# Patient Record
Sex: FEMALE | Race: WHITE | Employment: FULL TIME | URBAN - NONMETROPOLITAN AREA
[De-identification: names, ages, dates, MRNs, and addresses within clinical notes are randomized per-mention and may not be internally consistent; named-entity substitution may affect disease eponyms.]

---

## 2017-03-06 ENCOUNTER — OFFICE VISIT (OUTPATIENT)
Age: 57
End: 2017-03-06

## 2017-03-06 VITALS
HEIGHT: 62 IN | SYSTOLIC BLOOD PRESSURE: 134 MMHG | TEMPERATURE: 97.5 F | RESPIRATION RATE: 16 BRPM | DIASTOLIC BLOOD PRESSURE: 80 MMHG | WEIGHT: 149 LBS | HEART RATE: 76 BPM | BODY MASS INDEX: 27.42 KG/M2 | OXYGEN SATURATION: 96 %

## 2017-03-06 DIAGNOSIS — R10.13 EPIGASTRIC PAIN: ICD-10-CM

## 2017-03-06 DIAGNOSIS — R13.10 DYSPHAGIA, UNSPECIFIED TYPE: ICD-10-CM

## 2017-03-06 DIAGNOSIS — R12 HEARTBURN: Primary | ICD-10-CM

## 2017-03-06 DIAGNOSIS — Z98.890 S/P NISSEN FUNDOPLICATION (WITHOUT GASTROSTOMY TUBE) PROCEDURE: ICD-10-CM

## 2017-03-06 PROCEDURE — 99214 OFFICE O/P EST MOD 30 MIN: CPT | Performed by: SURGERY

## 2017-03-06 ASSESSMENT — ENCOUNTER SYMPTOMS
DIARRHEA: 1
EYE PAIN: 0
CHEST TIGHTNESS: 0
SORE THROAT: 0
BACK PAIN: 0
CONSTIPATION: 1
COUGH: 0
ABDOMINAL PAIN: 1
CHOKING: 0
EYE REDNESS: 0
VOMITING: 0
SINUS PRESSURE: 0
RECTAL PAIN: 0
EYE DISCHARGE: 0
STRIDOR: 0
PHOTOPHOBIA: 0
ANAL BLEEDING: 0
COLOR CHANGE: 0
BLOOD IN STOOL: 0
ABDOMINAL DISTENTION: 1
RHINORRHEA: 0
NAUSEA: 0
FACIAL SWELLING: 0
SHORTNESS OF BREATH: 0
VOICE CHANGE: 0
APNEA: 0
EYE ITCHING: 0
TROUBLE SWALLOWING: 0
WHEEZING: 0

## 2017-03-17 ENCOUNTER — TELEPHONE (OUTPATIENT)
Age: 57
End: 2017-03-17

## 2017-03-17 RX ORDER — PANTOPRAZOLE SODIUM 40 MG
40 TABLET, DELAYED RELEASE (ENTERIC COATED) ORAL 2 TIMES DAILY
Qty: 60 TABLET | Refills: 2 | Status: SHIPPED | OUTPATIENT
Start: 2017-03-17 | End: 2017-04-16

## 2017-11-09 ENCOUNTER — HOSPITAL ENCOUNTER (OUTPATIENT)
Dept: GENERAL RADIOLOGY | Age: 57
Discharge: HOME | End: 2017-11-09
Payer: COMMERCIAL

## 2017-11-09 DIAGNOSIS — K22.4 ESOPHAGEAL SPASM: ICD-10-CM

## 2017-11-09 DIAGNOSIS — K21.9 GASTROESOPHAGEAL REFLUX DISEASE, ESOPHAGITIS PRESENCE NOT SPECIFIED: ICD-10-CM

## 2017-11-09 PROCEDURE — 2500000003 HC RX 250 WO HCPCS: Performed by: INTERNAL MEDICINE

## 2017-11-09 PROCEDURE — 74220 X-RAY XM ESOPHAGUS 1CNTRST: CPT

## 2017-11-09 RX ADMIN — BARIUM SULFATE 140 ML: 980 POWDER, FOR SUSPENSION ORAL at 10:20

## 2017-11-09 RX ADMIN — BARIUM SULFATE 70 ML: 0.6 SUSPENSION ORAL at 10:20

## 2017-11-21 ENCOUNTER — HOSPITAL ENCOUNTER (OUTPATIENT)
Age: 57
Setting detail: OUTPATIENT SURGERY
Discharge: HOME | End: 2017-11-21
Attending: INTERNAL MEDICINE | Admitting: INTERNAL MEDICINE
Payer: COMMERCIAL

## 2017-11-21 VITALS
RESPIRATION RATE: 18 BRPM | WEIGHT: 151 LBS | HEIGHT: 62 IN | SYSTOLIC BLOOD PRESSURE: 129 MMHG | HEART RATE: 67 BPM | OXYGEN SATURATION: 97 % | BODY MASS INDEX: 27.79 KG/M2 | TEMPERATURE: 97.3 F | DIASTOLIC BLOOD PRESSURE: 71 MMHG

## 2017-11-21 PROCEDURE — 6370000000 HC RX 637 (ALT 250 FOR IP)

## 2017-11-21 ASSESSMENT — PAIN - FUNCTIONAL ASSESSMENT: PAIN_FUNCTIONAL_ASSESSMENT: 0-10

## 2018-01-20 ENCOUNTER — HOSPITAL ENCOUNTER (EMERGENCY)
Age: 58
Discharge: HOME | End: 2018-01-20
Payer: COMMERCIAL

## 2018-01-20 VITALS
BODY MASS INDEX: 27.6 KG/M2 | SYSTOLIC BLOOD PRESSURE: 139 MMHG | HEART RATE: 64 BPM | HEIGHT: 62 IN | OXYGEN SATURATION: 95 % | WEIGHT: 150 LBS | RESPIRATION RATE: 16 BRPM | TEMPERATURE: 98.1 F | DIASTOLIC BLOOD PRESSURE: 70 MMHG

## 2018-01-20 DIAGNOSIS — H70.92 MASTOIDITIS OF LEFT SIDE: Primary | ICD-10-CM

## 2018-01-20 PROCEDURE — 99213 OFFICE O/P EST LOW 20 MIN: CPT | Performed by: NURSE PRACTITIONER

## 2018-01-20 PROCEDURE — 99212 OFFICE O/P EST SF 10 MIN: CPT

## 2018-01-20 PROCEDURE — 96372 THER/PROPH/DIAG INJ SC/IM: CPT

## 2018-01-20 PROCEDURE — 6360000002 HC RX W HCPCS: Performed by: NURSE PRACTITIONER

## 2018-01-20 RX ORDER — NICOTINE 14MG/24HR
1 PATCH, TRANSDERMAL 24 HOURS TRANSDERMAL DAILY
Qty: 14 CAPSULE | Refills: 0 | Status: SHIPPED | OUTPATIENT
Start: 2018-01-20 | End: 2018-02-03

## 2018-01-20 RX ORDER — METHYLPREDNISOLONE ACETATE 40 MG/ML
40 INJECTION, SUSPENSION INTRA-ARTICULAR; INTRALESIONAL; INTRAMUSCULAR; SOFT TISSUE ONCE
Status: COMPLETED | OUTPATIENT
Start: 2018-01-20 | End: 2018-01-20

## 2018-01-20 RX ORDER — METHYLPREDNISOLONE ACETATE 80 MG/ML
80 INJECTION, SUSPENSION INTRA-ARTICULAR; INTRALESIONAL; INTRAMUSCULAR; SOFT TISSUE ONCE
Status: COMPLETED | OUTPATIENT
Start: 2018-01-20 | End: 2018-01-20

## 2018-01-20 RX ORDER — CLINDAMYCIN HYDROCHLORIDE 300 MG/1
300 CAPSULE ORAL 3 TIMES DAILY
Qty: 30 CAPSULE | Refills: 0 | Status: SHIPPED | OUTPATIENT
Start: 2018-01-20 | End: 2018-01-30

## 2018-01-20 RX ORDER — ACETAMINOPHEN AND CODEINE PHOSPHATE 300; 30 MG/1; MG/1
1-2 TABLET ORAL EVERY 6 HOURS PRN
Qty: 12 TABLET | Refills: 0 | Status: SHIPPED | OUTPATIENT
Start: 2018-01-20 | End: 2018-01-23

## 2018-01-20 RX ADMIN — METHYLPREDNISOLONE ACETATE 80 MG: 80 INJECTION, SUSPENSION INTRA-ARTICULAR; INTRALESIONAL; INTRAMUSCULAR; SOFT TISSUE at 15:43

## 2018-01-20 RX ADMIN — METHYLPREDNISOLONE ACETATE 40 MG: 40 INJECTION, SUSPENSION INTRA-ARTICULAR; INTRALESIONAL; INTRAMUSCULAR; SOFT TISSUE at 15:43

## 2018-01-20 RX ADMIN — PENICILLIN G BENZATHINE AND PENICILLIN G PROCAINE 1.2 MILLION UNITS: 900000; 300000 INJECTION, SUSPENSION INTRAMUSCULAR at 15:44

## 2018-01-20 ASSESSMENT — ENCOUNTER SYMPTOMS
COLOR CHANGE: 0
COUGH: 0
APNEA: 0
WHEEZING: 0
STRIDOR: 0
SHORTNESS OF BREATH: 0
CHEST TIGHTNESS: 0
BACK PAIN: 0
CHOKING: 0

## 2018-01-20 ASSESSMENT — PAIN DESCRIPTION - LOCATION: LOCATION: EAR

## 2018-01-20 ASSESSMENT — PAIN DESCRIPTION - PAIN TYPE: TYPE: ACUTE PAIN

## 2018-01-20 ASSESSMENT — PAIN DESCRIPTION - DESCRIPTORS: DESCRIPTORS: SHOOTING

## 2018-01-20 ASSESSMENT — PAIN SCALES - GENERAL: PAINLEVEL_OUTOF10: 10

## 2018-01-20 ASSESSMENT — PAIN DESCRIPTION - ORIENTATION: ORIENTATION: LEFT

## 2018-01-20 NOTE — ED PROVIDER NOTES
surgical history that includes Foot surgery (); laparoscopy (); Tonsillectomy (); Endoscopy, colon, diagnostic (12); Bunionectomy (12); Colonoscopy (12); Colonoscopy ();  section (); Appendectomy (); Breast surgery (2002); Tunneled venous port placement (2003 [facility])); other surgical history (2003); colectomy (2013); Upper gastrointestinal endoscopy (2/15/13); Dilatation, esophagus (); Dilation and curettage of uterus ([id number]); Foot Osteotomy (Right, 2013); hernia repair (3/1/16); Knee arthroscopy (Right, 2016); Ovary removal (Right, ); hiatal hernia repair (2016); and Abdomen surgery. CURRENT MEDICATIONS       Previous Medications    DOCUSATE SODIUM (COLACE) 100 MG CAPSULE    Take 1 capsule by mouth 2 times daily as needed for Constipation    MULTIPLE VITAMINS-MINERALS (WOMENS MULTI VITAMIN & MINERAL) TABS    Take 1 tablet by mouth daily    NAPROXEN (NAPROSYN) 500 MG TABLET    500 mg daily    OMEPRAZOLE (PRILOSEC) 40 MG DELAYED RELEASE CAPSULE    Take 1 capsule by mouth every morning On empty stomach followed by a meal within 20 min    PROTONIX 40 MG TABLET    Take 1 tablet by mouth 2 times daily    RANITIDINE (ZANTAC) 150 MG TABLET    Take 1 tablet by mouth nightly    SERTRALINE (ZOLOFT) 50 MG TABLET    Take 50 mg by mouth daily       ALLERGIES     Patient is is allergic to vicodin [hydrocodone-acetaminophen]; duricef [cefadroxil]; and tape [adhesive tape]. FAMILY HISTORY     Patient's family history includes Arthritis in her mother; Cancer in her father; Heart Disease in her mother; High Blood Pressure in her father. SOCIAL HISTORY     Patient  reports that she has never smoked. She has never used smokeless tobacco. She reports that she does not drink alcohol or use drugs.     PHYSICAL EXAM     ED TRIAGE VITALS  BP: (!) 149/72, Temp: 98.1 °F (36.7 °C), Pulse: 82, Resp: 16, SpO2: 95 %  Physical Exam   Constitutional:

## 2018-05-15 ENCOUNTER — HOSPITAL ENCOUNTER (OUTPATIENT)
Dept: WOMENS IMAGING | Age: 58
Discharge: HOME | End: 2018-05-15
Payer: COMMERCIAL

## 2018-05-15 DIAGNOSIS — M81.0 OSTEOPOROSIS, UNSPECIFIED OSTEOPOROSIS TYPE, UNSPECIFIED PATHOLOGICAL FRACTURE PRESENCE: ICD-10-CM

## 2018-05-15 PROCEDURE — 77080 DXA BONE DENSITY AXIAL: CPT

## 2019-08-15 ENCOUNTER — PROCEDURE VISIT (OUTPATIENT)
Dept: NEUROLOGY | Age: 59
End: 2019-08-15
Payer: COMMERCIAL

## 2019-08-15 DIAGNOSIS — R20.0 BILATERAL HAND NUMBNESS: ICD-10-CM

## 2019-08-15 DIAGNOSIS — G56.03 BILATERAL CARPAL TUNNEL SYNDROME: Primary | ICD-10-CM

## 2019-08-15 PROCEDURE — 95886 MUSC TEST DONE W/N TEST COMP: CPT | Performed by: PSYCHIATRY & NEUROLOGY

## 2019-08-15 PROCEDURE — 95911 NRV CNDJ TEST 9-10 STUDIES: CPT | Performed by: PSYCHIATRY & NEUROLOGY

## 2020-10-20 ENCOUNTER — NURSE ONLY (OUTPATIENT)
Dept: LAB | Age: 60
End: 2020-10-20

## 2022-11-17 ENCOUNTER — HOSPITAL ENCOUNTER (EMERGENCY)
Age: 62
Discharge: HOME | End: 2022-11-17
Attending: EMERGENCY MEDICINE
Payer: COMMERCIAL

## 2022-11-17 ENCOUNTER — APPOINTMENT (OUTPATIENT)
Dept: CT IMAGING | Age: 62
End: 2022-11-17
Payer: COMMERCIAL

## 2022-11-17 VITALS
OXYGEN SATURATION: 94 % | BODY MASS INDEX: 28.16 KG/M2 | HEIGHT: 62 IN | DIASTOLIC BLOOD PRESSURE: 74 MMHG | RESPIRATION RATE: 20 BRPM | TEMPERATURE: 97.9 F | SYSTOLIC BLOOD PRESSURE: 143 MMHG | WEIGHT: 153 LBS | HEART RATE: 75 BPM

## 2022-11-17 DIAGNOSIS — N20.1 URETEROLITHIASIS: Primary | ICD-10-CM

## 2022-11-17 DIAGNOSIS — N13.39 OTHER HYDRONEPHROSIS: ICD-10-CM

## 2022-11-17 LAB
ALBUMIN SERPL-MCNC: 4.2 G/DL (ref 3.5–5.1)
ALP BLD-CCNC: 77 U/L (ref 38–126)
ALT SERPL-CCNC: 11 U/L (ref 11–66)
ANION GAP SERPL CALCULATED.3IONS-SCNC: 12 MEQ/L (ref 8–16)
AST SERPL-CCNC: 24 U/L (ref 5–40)
BACTERIA: ABNORMAL /HPF
BASOPHILS # BLD: 0.6 %
BASOPHILS ABSOLUTE: 0 THOU/MM3 (ref 0–0.1)
BILIRUB SERPL-MCNC: 0.7 MG/DL (ref 0.3–1.2)
BILIRUBIN DIRECT: < 0.2 MG/DL (ref 0–0.3)
BILIRUBIN URINE: NEGATIVE
BLOOD, URINE: ABNORMAL
BUN BLDV-MCNC: 25 MG/DL (ref 7–22)
CALCIUM SERPL-MCNC: 8.6 MG/DL (ref 8.5–10.5)
CASTS 2: ABNORMAL /LPF
CASTS UA: ABNORMAL /LPF
CHARACTER, URINE: ABNORMAL
CHLORIDE BLD-SCNC: 106 MEQ/L (ref 98–111)
CO2: 23 MEQ/L (ref 23–33)
COLOR: YELLOW
CREAT SERPL-MCNC: 0.6 MG/DL (ref 0.4–1.2)
CRYSTALS, UA: ABNORMAL
EKG ATRIAL RATE: 68 BPM
EKG P AXIS: 88 DEGREES
EKG P-R INTERVAL: 190 MS
EKG Q-T INTERVAL: 422 MS
EKG QRS DURATION: 78 MS
EKG QTC CALCULATION (BAZETT): 448 MS
EKG R AXIS: 61 DEGREES
EKG T AXIS: 56 DEGREES
EKG VENTRICULAR RATE: 68 BPM
EOSINOPHIL # BLD: 1.6 %
EOSINOPHILS ABSOLUTE: 0.1 THOU/MM3 (ref 0–0.4)
EPITHELIAL CELLS, UA: ABNORMAL /HPF
ERYTHROCYTE [DISTWIDTH] IN BLOOD BY AUTOMATED COUNT: 13.8 % (ref 11.5–14.5)
ERYTHROCYTE [DISTWIDTH] IN BLOOD BY AUTOMATED COUNT: 48.5 FL (ref 35–45)
GFR SERPL CREATININE-BSD FRML MDRD: > 60 ML/MIN/1.73M2
GLUCOSE BLD-MCNC: 123 MG/DL (ref 70–108)
GLUCOSE URINE: NEGATIVE MG/DL
HCT VFR BLD CALC: 40.9 % (ref 37–47)
HEMOGLOBIN: 13.6 GM/DL (ref 12–16)
IMMATURE GRANS (ABS): 0.04 THOU/MM3 (ref 0–0.07)
IMMATURE GRANULOCYTES: 0.6 %
KETONES, URINE: NEGATIVE
LEUKOCYTE ESTERASE, URINE: ABNORMAL
LIPASE: 30.5 U/L (ref 5.6–51.3)
LYMPHOCYTES # BLD: 29.6 %
LYMPHOCYTES ABSOLUTE: 2 THOU/MM3 (ref 1–4.8)
MCH RBC QN AUTO: 32 PG (ref 26–33)
MCHC RBC AUTO-ENTMCNC: 33.3 GM/DL (ref 32.2–35.5)
MCV RBC AUTO: 96.2 FL (ref 81–99)
MISCELLANEOUS 2: ABNORMAL
MONOCYTES # BLD: 8.9 %
MONOCYTES ABSOLUTE: 0.6 THOU/MM3 (ref 0.4–1.3)
NITRITE, URINE: NEGATIVE
NUCLEATED RED BLOOD CELLS: 0 /100 WBC
OSMOLALITY CALCULATION: 287 MOSMOL/KG (ref 275–300)
PH UA: 7.5 (ref 5–9)
PLATELET # BLD: 321 THOU/MM3 (ref 130–400)
PMV BLD AUTO: 9.2 FL (ref 9.4–12.4)
POTASSIUM SERPL-SCNC: 3.7 MEQ/L (ref 3.5–5.2)
PROTEIN UA: ABNORMAL
RBC # BLD: 4.25 MILL/MM3 (ref 4.2–5.4)
RBC URINE: ABNORMAL /HPF
RENAL EPITHELIAL, UA: ABNORMAL
SEG NEUTROPHILS: 58.7 %
SEGMENTED NEUTROPHILS ABSOLUTE COUNT: 4.1 THOU/MM3 (ref 1.8–7.7)
SODIUM BLD-SCNC: 141 MEQ/L (ref 135–145)
SPECIFIC GRAVITY, URINE: 1.02 (ref 1–1.03)
TOTAL PROTEIN: 6.7 G/DL (ref 6.1–8)
TROPONIN T: < 0.01 NG/ML
UROBILINOGEN, URINE: 1 EU/DL (ref 0–1)
WBC # BLD: 6.9 THOU/MM3 (ref 4.8–10.8)
WBC UA: ABNORMAL /HPF
YEAST: ABNORMAL

## 2022-11-17 PROCEDURE — 6370000000 HC RX 637 (ALT 250 FOR IP): Performed by: STUDENT IN AN ORGANIZED HEALTH CARE EDUCATION/TRAINING PROGRAM

## 2022-11-17 PROCEDURE — 84484 ASSAY OF TROPONIN QUANT: CPT

## 2022-11-17 PROCEDURE — 96375 TX/PRO/DX INJ NEW DRUG ADDON: CPT

## 2022-11-17 PROCEDURE — 36415 COLL VENOUS BLD VENIPUNCTURE: CPT

## 2022-11-17 PROCEDURE — 6360000002 HC RX W HCPCS: Performed by: EMERGENCY MEDICINE

## 2022-11-17 PROCEDURE — 6360000002 HC RX W HCPCS: Performed by: STUDENT IN AN ORGANIZED HEALTH CARE EDUCATION/TRAINING PROGRAM

## 2022-11-17 PROCEDURE — 83690 ASSAY OF LIPASE: CPT

## 2022-11-17 PROCEDURE — 85025 COMPLETE CBC W/AUTO DIFF WBC: CPT

## 2022-11-17 PROCEDURE — 96374 THER/PROPH/DIAG INJ IV PUSH: CPT

## 2022-11-17 PROCEDURE — 80053 COMPREHEN METABOLIC PANEL: CPT

## 2022-11-17 PROCEDURE — 82248 BILIRUBIN DIRECT: CPT

## 2022-11-17 PROCEDURE — 6360000004 HC RX CONTRAST MEDICATION: Performed by: EMERGENCY MEDICINE

## 2022-11-17 PROCEDURE — 99285 EMERGENCY DEPT VISIT HI MDM: CPT

## 2022-11-17 PROCEDURE — 2580000003 HC RX 258: Performed by: STUDENT IN AN ORGANIZED HEALTH CARE EDUCATION/TRAINING PROGRAM

## 2022-11-17 PROCEDURE — 96376 TX/PRO/DX INJ SAME DRUG ADON: CPT

## 2022-11-17 PROCEDURE — 81001 URINALYSIS AUTO W/SCOPE: CPT

## 2022-11-17 PROCEDURE — 93005 ELECTROCARDIOGRAM TRACING: CPT | Performed by: STUDENT IN AN ORGANIZED HEALTH CARE EDUCATION/TRAINING PROGRAM

## 2022-11-17 PROCEDURE — 93010 ELECTROCARDIOGRAM REPORT: CPT | Performed by: INTERNAL MEDICINE

## 2022-11-17 PROCEDURE — 74177 CT ABD & PELVIS W/CONTRAST: CPT

## 2022-11-17 RX ORDER — FENTANYL CITRATE 50 UG/ML
25 INJECTION, SOLUTION INTRAMUSCULAR; INTRAVENOUS ONCE
Status: COMPLETED | OUTPATIENT
Start: 2022-11-17 | End: 2022-11-17

## 2022-11-17 RX ORDER — ONDANSETRON 2 MG/ML
4 INJECTION INTRAMUSCULAR; INTRAVENOUS ONCE
Status: COMPLETED | OUTPATIENT
Start: 2022-11-17 | End: 2022-11-17

## 2022-11-17 RX ORDER — KETOROLAC TROMETHAMINE 30 MG/ML
15 INJECTION, SOLUTION INTRAMUSCULAR; INTRAVENOUS ONCE
Status: COMPLETED | OUTPATIENT
Start: 2022-11-17 | End: 2022-11-17

## 2022-11-17 RX ORDER — 0.9 % SODIUM CHLORIDE 0.9 %
1000 INTRAVENOUS SOLUTION INTRAVENOUS ONCE
Status: COMPLETED | OUTPATIENT
Start: 2022-11-17 | End: 2022-11-17

## 2022-11-17 RX ORDER — ACETAMINOPHEN 325 MG/1
650 TABLET ORAL ONCE
Status: COMPLETED | OUTPATIENT
Start: 2022-11-17 | End: 2022-11-17

## 2022-11-17 RX ORDER — ONDANSETRON 4 MG/1
4 TABLET, FILM COATED ORAL 3 TIMES DAILY PRN
Qty: 15 TABLET | Refills: 0 | Status: SHIPPED | OUTPATIENT
Start: 2022-11-17

## 2022-11-17 RX ORDER — NAPROXEN 500 MG/1
500 TABLET ORAL 2 TIMES DAILY PRN
Qty: 14 TABLET | Refills: 0 | Status: SHIPPED | OUTPATIENT
Start: 2022-11-17 | End: 2022-12-01

## 2022-11-17 RX ADMIN — KETOROLAC TROMETHAMINE 15 MG: 30 INJECTION, SOLUTION INTRAMUSCULAR at 12:30

## 2022-11-17 RX ADMIN — SODIUM CHLORIDE 1000 ML: 9 INJECTION, SOLUTION INTRAVENOUS at 11:45

## 2022-11-17 RX ADMIN — ONDANSETRON 4 MG: 2 INJECTION INTRAMUSCULAR; INTRAVENOUS at 13:02

## 2022-11-17 RX ADMIN — FENTANYL CITRATE 25 MCG: 50 INJECTION, SOLUTION INTRAMUSCULAR; INTRAVENOUS at 11:45

## 2022-11-17 RX ADMIN — IOPAMIDOL 80 ML: 755 INJECTION, SOLUTION INTRAVENOUS at 12:19

## 2022-11-17 RX ADMIN — ONDANSETRON 4 MG: 2 INJECTION INTRAMUSCULAR; INTRAVENOUS at 11:45

## 2022-11-17 RX ADMIN — ACETAMINOPHEN 650 MG: 325 TABLET ORAL at 13:02

## 2022-11-17 ASSESSMENT — PAIN - FUNCTIONAL ASSESSMENT
PAIN_FUNCTIONAL_ASSESSMENT: 0-10

## 2022-11-17 ASSESSMENT — PAIN SCALES - GENERAL
PAINLEVEL_OUTOF10: 10
PAINLEVEL_OUTOF10: 10
PAINLEVEL_OUTOF10: 8
PAINLEVEL_OUTOF10: 7

## 2022-11-17 ASSESSMENT — PAIN DESCRIPTION - ORIENTATION: ORIENTATION: LEFT

## 2022-11-17 ASSESSMENT — ENCOUNTER SYMPTOMS
CONSTIPATION: 0
SHORTNESS OF BREATH: 0
BACK PAIN: 0
ABDOMINAL PAIN: 1
NAUSEA: 1
EYE PAIN: 0
VOMITING: 1
COUGH: 0
SINUS PAIN: 0
DIARRHEA: 1

## 2022-11-17 ASSESSMENT — PAIN DESCRIPTION - LOCATION: LOCATION: FLANK

## 2022-11-17 NOTE — DISCHARGE INSTRUCTIONS
Continue Zofran as needed for nausea or vomiting  You can use Tylenol and/or naproxen as needed for pain  Do not use naproxen with ibuprofen or other NSAIDs as they are similar medications  Make sure to eat and drink plenty fluids to stay well-hydrated  Follow-up with your family doctor within a week

## 2022-11-17 NOTE — ED PROVIDER NOTES
[place] ENCOUN[facility]          Pt Name: Belinda Escobar  MRN: [id number]  [place] 1960  Date of evaluation: 11/17/2022  Treating Resident Physician: Kartik Ferrera MD  Supervising Physician: Dr. Naty Crews, [place]       Chief Complaint   Patient presents with    Flank Pain     left     History obtained from the patient. HISTORY OF PRESENT ILLNESS    HPI  Belinda Escobar is a 65 y.o. female who presents to the emergency department for evaluation of abdominal pain. Patient states she woke up this morning began having left-sided abdominal pain. States it radiates to back  and left flank and back. States that she also is having nausea with vomiting. Denies any bile or blood in her vomit. Mentions some loose stools without blood. Denies any fevers or chills. Also mentions some dysuria. Denies any odor or drainage discharge with urination. States she has a history of diverticulitis as well as a kidney stone. States that she has not had anything to eat today. Denies any headache fever chills cough chest pain shortness of breath. Denies any constipation or leg swelling        Patient denies any new Headache, Fever, Chills, Cough, Chest pain, Shortness of breath, Constipation, and Leg swelling. The patient has no other acute complaints at this time. REVIEW OF SYSTEMS   Review of Systems   Constitutional:  Negative for chills and fever. HENT:  Negative for ear pain and sinus pain. Eyes:  Negative for pain. Respiratory:  Negative for cough and shortness of breath. Cardiovascular:  Negative for chest pain and leg swelling. Gastrointestinal:  Positive for abdominal pain, diarrhea, nausea and vomiting. Negative for constipation. Genitourinary:  Negative for dysuria and flank pain. Musculoskeletal:  Negative for back pain and neck pain. Skin:  Negative for wound. Neurological:  Negative for headaches. Psychiatric/Behavioral:  Negative for confusion. PAST MEDICAL AND SURGICAL HISTORY     Past Medical History:   Diagnosis Date    Arthritis     Cancer ([facility] [address]     breast 2002 left    Difficult intubation     \"previously had vocal cord damage, used smaller tube (pedi) and has worked fine\"    Fracture of lumbar spine ([facility])     Hernia, hiatal     Nausea & vomiting     scopalomine patch works well    PONV (postoperative nausea and vomiting)     Prolonged emergence from general anesthesia     slow to wake     Past Surgical History:   Procedure Laterality Date    Gladys C[facility]denas SURGERY  12/2002    left lumpectomy-Dr. Latoya Vides  09/06/12    RT-Dr. Dayanna Mcintosh  01/17/2013    robotic assisted rt colectomy-Dr. Mable Willson    COLONOSCOPY  12-11-12    Dr. Judith Thayer  2003    Dr. Richard Roman, ESOPHAGUS  2013    colon resection    [facility]    x2    ENDOSCOPY, COLON, DIAGNOSTIC  12-11-12    Dr. Augustine Calloway Right 11/7/2013    1st metatarsal cuneiform fusion, Weil osteotomy 2nd-os staple     [address]   3/1/16    robot hiatal hernia with fundoplication    HIATAL HERNIA REPAIR  03/2016    KNEE ARTHROSCOPY Right 06/2016    cortizone shots left knee    LAPAROSCOPY  2000    right oopherectomy-Dr. Giovanni Stover    OTHER SURGICAL HISTORY  9/2003    Port removal-Dr. Ordaz    OVARY REMOVAL Right 2000    TONSILLECTOMY  1979    TUNNELED VENOUS PORT PLACEMENT  1/2003 ([facility])    Port inserted in radiology    UPPER GASTROINTESTINAL ENDOSCOPY  2/15/13    egd         MEDICATIONS   No current facility-administered medications for this encounter.     Current Outpatient Medications:     naproxen (NAPROSYN) 500 MG tablet, Take 1 tablet by mouth 2 times daily as needed for Pain, Disp: 14 tablet, Rfl: 0    ondansetron (ZOFRAN) 4 MG tablet, Take 1 tablet by mouth 3 times daily as needed for Nausea or Vomiting, Disp: 15 tablet, Rfl: 0    PROTONIX 40 MG tablet, Take 1 tablet by mouth 2 times daily, Disp: 60 tablet, Rfl: 2    naproxen (NAPROSYN) 500 MG tablet, 500 mg daily, Disp: , Rfl:     Multiple Vitamins-Minerals (WOMENS MULTI VITAMIN & MINERAL) TABS, Take 1 tablet by mouth daily, Disp: , Rfl:     omeprazole (PRILOSEC) 40 MG delayed release capsule, Take 1 capsule by mouth every morning On empty stomach followed by a meal within 20 min, Disp: 90 capsule, Rfl: 3    ranitidine (ZANTAC) 150 MG tablet, Take 1 tablet by mouth nightly, Disp: 90 tablet, Rfl: 3    docusate sodium (COLACE) 100 MG capsule, Take 1 capsule by mouth 2 times daily as needed for Constipation, Disp: 30 capsule, Rfl: 1    sertraline (ZOLOFT) 50 MG tablet, Take 50 mg by mouth daily, Disp: , Rfl:       SOCIAL HISTORY     Social History     Social History Narrative    Not on file     Social History     Tobacco Use    Smoking status: Never    Smokeless tobacco: Never   Substance Use Topics    Alcohol use: No    Drug use: No         ALLERGIES     Allergies   Allergen Reactions    Vicodin [Hydrocodone-Acetaminophen] Nausea Only     Pupils dialated    Duricef [Cefadroxil] Nausea And Vomiting    Tape [Adhesive Tape] Rash         FAMILY HISTORY     Family History   Problem Relation Age of Onset    High Blood Pressure Father     Cancer Father         kidney  prostate    Arthritis Mother     Heart Disease Mother          PREVIOUS RECORDS   Previous records reviewed:  Patient was seen last on 2/13/2022 for COVID-19 testing . PHYSICAL EXAM     ED Triage Vitals   BP Temp Temp src Pulse Resp SpO2 Height Weight   -- -- -- -- -- -- -- --     Initial vital signs and nursing assessment reviewed and vitals are/show: Afebrile, Borderline hypotensive, Normocardic, and Borderline tachypneic . Pulsoximetry is normal per my interpretation.     Additional Vital Signs:  Vitals:    11/17/22 1232   BP: (!) 143/74   Pulse: 75 Resp: 20   Temp:    SpO2: 94%       Physical Exam  Constitutional:       General: She is not in acute distress. Appearance: Normal appearance. She is not ill-appearing, toxic-appearing or diaphoretic. HENT:      Head: Normocephalic and atraumatic. Right Ear: External ear normal.      Left Ear: External ear normal.   Eyes:      General: No scleral icterus. Right eye: No discharge. Left eye: No discharge. Cardiovascular:      Rate and Rhythm: Normal rate and regular rhythm. Pulmonary:      Effort: Pulmonary effort is normal. No respiratory distress. Breath sounds: Normal breath sounds. No stridor. No wheezing, rhonchi or rales. Chest:      Chest wall: No tenderness. Abdominal:      General: Abdomen is flat. There is no distension. Palpations: Abdomen is soft. Tenderness: There is abdominal tenderness. There is no right CVA tenderness, left CVA tenderness, guarding or rebound. Comments: Left lower quadrant and left upper quadrant tenderness palpation  No guarding or rebound no rigidity  No CVA tenderness   Musculoskeletal:      Cervical back: Neck supple. Right lower leg: No edema. Left lower leg: No edema. Skin:     General: Skin is warm and dry. Neurological:      Mental Status: She is alert and oriented to person, place, and time. Mental status is at baseline. Psychiatric:         Mood and Affect: Mood normal.         Behavior: Behavior normal.         Thought Content: Thought content normal.         Judgment: Judgment normal.           MEDICAL DECISION MAKING   Initial Assessment:     79-year-old female presenting to the ED for left-sided abdominal flank pain and nausea vomiting.     Differential diagnoses include but not limited to: Nephrolithiasis diverticulitis obstruction cholecystitis pancreatitis UTI pyelonephritis hydronephrosis ACS arrhythmia electrolyte abnormality dehydration     Plan:       EKG  Labs  Imaging  IV fluids  Tylenol  Zofran  Analgesia  Discharge        Patient is a 65 y.o. female who was seen and evaluated in the emergency department for abdominal pain. Vital signs were stable. Lab work was pretty unremarkable. Her kidney function was normal she did not have a elevated WBC and UA did not show UTI. CT showed a small kidney stone as well as bilateral hydronephrosis. Given that she is able to urinate and and her kidney function is normal this bilateral hydronephrosis is likely a chronic process. The kidney stone itself was very small so it likely will pass on its own. Her symptoms improved in the ED. We discussed strict return precautions. Through shared decision-making, patient felt comfortable with being discharged and following up with her PCP. ED RESULTS   Laboratory results:  Labs Reviewed   BASIC METABOLIC PANEL - Abnormal; Notable for the following components:       Result Value    Glucose 123 (*)     BUN 25 (*)     All other components within normal limits   CBC WITH AUTO DIFFERENTIAL - Abnormal; Notable for the following components:    RDW-SD 48.5 (*)     MPV 9.2 (*)     All other components within normal limits   URINE WITH REFLEXED MICRO - Abnormal; Notable for the following components:    Blood, Urine SMALL (*)     Protein, UA TRACE (*)     Leukocyte Esterase, Urine TRACE (*)     Character, Urine TURBID (*)     All other components within normal limits   LIPASE   TROPONIN   HEPATIC FUNCTION PANEL   GLOMERULAR FILTRATION RATE, ESTIMATED   ANION GAP   OSMOLALITY       Radiologic studies results:  CT ABDOMEN PELVIS W IV CONTRAST Additional Contrast? None   Final Result      1. Bilateral hydronephrosis. Tiny stone at the left ureterovesical junction. 2. Diffuse fatty replacement in the liver. 3. Gallstones. 4. Postoperative changes at the gastroesophageal junction. Hiatal hernia. 5. Postoperative changes involving the small bowel loops.    6. Abnormal mucosal thickening in the colon. Evidence of diverticulosis with no acute diverticulitis. 7. Lumbar spondylosis. **This report has been created using voice recognition software. It may contain minor errors which are inherent in voice recognition technology. **      Final report electronically signed by DR Moreno Jain on 11/17/2022 12:36 PM          ED Medications administered this visit:   Medications   0.9 % sodium chloride bolus (0 mLs IntraVENous Stopped 11/17/22 1315)   acetaminophen (TYLENOL) tablet 650 mg (650 mg Oral Given 11/17/22 1302)   ondansetron (ZOFRAN) injection 4 mg (4 mg IntraVENous Given 11/17/22 1145)   fentaNYL (SUBLIMAZE) injection 25 mcg (25 mcg IntraVENous Given 11/17/22 1145)   ketorolac (TORADOL) injection 15 mg (15 mg IntraVENous Given 11/17/22 1230)   iopamidol (ISOVUE-370) 76 % injection 80 mL (80 mLs IntraVENous Given 11/17/22 1219)   ondansetron (ZOFRAN) injection 4 mg (4 mg IntraVENous Given 11/17/22 1302)         ED COURSE     ED Course as of 11/17/22 1501   Thu Nov 17, 2022   1157 EKG shows no new signs of acute ischemia [CR]   1229 CBC unremarkable  BMP unremarkable  Lipase troponin unremarkable  UA is less suggestive of UTI [CR]   1239 CT: IMPRESSION:     1. Bilateral hydronephrosis. Tiny stone at the left ureterovesical junction. 2. Diffuse fatty replacement in the liver. 3. Gallstones. 4. Postoperative changes at the gastroesophageal junction. Hiatal hernia. 5. Postoperative changes involving the small bowel loops. 6. Abnormal mucosal thickening in the colon. Evidence of diverticulosis with no acute diverticulitis. 7. Lumbar spondylosis. [CR]      ED Course User Index  [CR] Marquise Negro MD        Strict return precautions and follow up instructions were discussed with the patient prior to discharge, with which the patient agrees.       MEDICATION CHANGES     Discharge Medication List as of 11/17/2022 12:55 PM        START taking these medications    Details   !! naproxen (NAPROSYN) 500 MG tablet Take 1 tablet by mouth 2 times daily as needed for Pain, Disp-14 tablet, R-0Normal      ondansetron (ZOFRAN) 4 MG tablet Take 1 tablet by mouth 3 times daily as needed for Nausea or Vomiting, Disp-15 tablet, R-0Normal       !! - Potential duplicate medications found. Please discuss with provider. FINAL DISPOSITION     Final diagnoses:   Ureterolithiasis   Other hydronephrosis     Condition: condition: stable  Dispo: Discharge to home      This transcription was electronically signed. Parts of this transcriptions may have been dictated by use of voice recognition software and electronically transcribed, and parts may have been transcribed with the assistance of an ED scribe. The transcription may contain errors not detected in proofreading. Please refer to my supervising physician's documentation if my documentation differs.     Electronically Signed: Rufina Jc MD, 11/17/22, 3:01 Sabrina Bhatt MD  Resident  11/17/22 2018

## 2022-11-17 NOTE — ED NOTES
Pt medicated per MAR. Pt ambulated to bathroom for urine specimen. EKG complete. Pt updated on POC.  Given blanket per request. [address] [facility],  [address], RN  11/17/22 2134

## 2022-11-17 NOTE — ED TRIAGE NOTES
Pt presents to the ER from home with c/o left sided flank pain that started this morning. Pt also reports burning upon urination. Upon arrival, pt is experiencing emesis. Pt denies pain meds PTA. Pt has hx of gallstones. Pt is alert and oriented, respirations even and unlabored.  VSS

## 2022-11-17 NOTE — ED NOTES
Pt back from radiology, medicated per [facility]. No other needs or concerns expressed at this time.  [facility], [address] [place] [address]  11/17/22 6564

## 2022-11-30 ENCOUNTER — HOSPITAL ENCOUNTER (OUTPATIENT)
Dept: GENERAL RADIOLOGY | Age: 62
Discharge: HOME | End: 2022-11-30

## 2022-11-30 DIAGNOSIS — Z00.6 ENCOUNTER FOR EXAMINATION FOR NORMAL COMPARISON AND CONTROL IN CLINICAL RESEARCH PROGRAM: ICD-10-CM

## 2022-12-01 ENCOUNTER — OFFICE VISIT (OUTPATIENT)
Dept: SURGERY | Age: 62
End: 2022-12-01

## 2022-12-01 VITALS
WEIGHT: 153 LBS | HEIGHT: 62 IN | OXYGEN SATURATION: 96 % | BODY MASS INDEX: 28.16 KG/M2 | SYSTOLIC BLOOD PRESSURE: 122 MMHG | TEMPERATURE: 97.5 F | DIASTOLIC BLOOD PRESSURE: 70 MMHG | HEART RATE: 77 BPM

## 2022-12-01 DIAGNOSIS — M81.0 OSTEOPOROSIS, UNSPECIFIED OSTEOPOROSIS TYPE, UNSPECIFIED PATHOLOGICAL FRACTURE PRESENCE: ICD-10-CM

## 2022-12-01 DIAGNOSIS — K22.2 ESOPHAGEAL STENOSIS: ICD-10-CM

## 2022-12-01 DIAGNOSIS — K80.20 CALCULUS OF GALLBLADDER WITHOUT CHOLECYSTITIS WITHOUT OBSTRUCTION: Primary | ICD-10-CM

## 2022-12-01 RX ORDER — TIZANIDINE 4 MG/1
4 TABLET ORAL AS NEEDED
COMMUNITY

## 2022-12-01 RX ORDER — CITALOPRAM 20 MG/1
30 TABLET ORAL DAILY
COMMUNITY

## 2022-12-01 RX ORDER — SUCRALFATE 1 G/1
1 TABLET ORAL 3 TIMES DAILY
COMMUNITY

## 2022-12-01 RX ORDER — GABAPENTIN 300 MG/1
300 CAPSULE ORAL 2 TIMES DAILY
COMMUNITY

## 2022-12-01 RX ORDER — VALACYCLOVIR HYDROCHLORIDE 500 MG/1
500 TABLET, FILM COATED ORAL DAILY
COMMUNITY

## 2022-12-01 RX ORDER — DICYCLOMINE HCL 20 MG
20 TABLET ORAL AS NEEDED
COMMUNITY

## 2022-12-01 RX ORDER — GLUCOSAMINE/D3/BOSWELLIA SERRA 1500MG-400
20000 TABLET ORAL DAILY
COMMUNITY

## 2022-12-01 RX ORDER — FUROSEMIDE 20 MG/1
20 TABLET ORAL AS NEEDED
COMMUNITY

## 2022-12-01 NOTE — PROGRESS NOTES
Chapin Lindsey MD   General Surgery  New Patient Evaluation in Office  Pt Name: Duc Pickering  Date of Birth 1960   Today's Date: 12/1/2022  Medical Record Number: [id number]  Referring Provider: No ref. provider found  Primary Care Provider: Telma Steele DO  Chief Complaint:  Chief Complaint   Patient presents with    Surgical Consult     New patient- referred by Dr. Fatou Faust - Gallstones, GERD, LLQ abdominal pain     ASSESSMENT      1. Calculus of gallbladder without cholecystitis without obstruction    2. Osteoporosis, unspecified osteoporosis type, unspecified pathological fracture presence    3. Esophageal stenosis    4. Recent passed renal stone   5. GERD symptoms. 6.  IBS  7. Previous hiatal hernia repair with Nissen fundoplication and redo at [address] [place] [address]  PLANS      Schedule [facility] for robotic assisted laparoscopic cholecystectomy possible open  The risks, options andalternatives were discussed at length with the patient. The risks including bleeding, infection, reoperation, bile duct injury and possible conversion to an open procedure were covered as well as nonresolution of pain. The possibility of other unforeseen complications including bile leak were also mentioned. We also discussed the conduct of the operation, probable outpatient stay postoperatively and the typical post operative recovery andrestrictions. Potential exacerbation of diarrhea postoperatively discussed as well. The patients questions were answered. After this discussion, the patient would like to proceed with cholecystectomy. The advantages and disadvantages of using this robotic technology were discussed with the patient including but not limited to technical issues, limitations with exposure and potential conversion to an openprocedure. The patient was given an opportunity to ask questions. Once answered, the patient is agreeable to proceed with a planned robotic assisted approach.    Status: outpatient  Planned anesthesia: general  She will undergo pre-operative testing per anesthesia guidelines with risk factors listed under the past medical history diagnosis & problem list.  7.  Imaging and recent upper GI reviewed      Rodney Obregon is a 65y.o. year old female seen regarding cholelithiasis with intermittent biliary colic symptoms. She has had previous gallbladder imaging shows a stone in the neck of the gallbladder. She has had a prior right colon resection for an adenoma. She is due for colonoscopy next year. She also reports some reflux symptoms small hiatal hernia and esophageal stenosis. She has no prior history of hepatitis pancreatitis or jaundice. Recent liver function test within normal limits. She does an extensive amount of traveling due to symptoms she does not want to be caught out of town wants to proceed with cholecystectomy.   Past Medical History  Past Medical History:   Diagnosis Date    Anxiety     Arthritis     Cancer ([facility] [address]     breast 2002 left    Difficult intubation     \"previously had vocal cord damage, used smaller tube (pedi) and has worked fine\"    Fracture of lumbar spine (HCC)     Hernia, hiatal     Nausea & vomiting     scopalomine patch works well    Osteoarthritis     PONV (postoperative nausea and vomiting)     Prolonged emergence from general anesthesia     slow to wake       Past Surgical History  Past Surgical History:   Procedure Laterality Date    [address]  12/2002    left lumpectomy-Dr. Tracy Monroy  09/06/2012    RT-Dr. Cunningham [facility] Bilateral 2019    [address]     COLECTOMY  01/17/2013    robotic assisted rt colectomy-Dr. Bandar Garcia    COLONOSCOPY  12/11/2012    Dr. Kimo Andrew  2003    Dr. Sandra Starkey, ESOPHAGUS  2013    colon resection    [address] [place] [address] ENDOSCOPY, COLON, DIAGNOSTIC  12/11/2012    Dr. Wen Kerrie Right 11/07/2013    1st metatarsal cuneiform fusion, Weil osteotomy 2nd-os staple     [address]   03/01/2016    robot hiatal hernia with fundoplication    HIATAL HERNIA REPAIR  03/2016    KNEE ARTHROSCOPY Right 06/2016    cortizone shots left knee    LAPAROSCOPY  2000    right oopherectomy-Dr. Natalie Gautam    OTHER SURGICAL HISTORY  09/2003    Port removal-Dr. Ordaz    OVARY REMOVAL Right 2000    TONSILLECTOMY  1979    TUNNELED VENOUS PORT PLACEMENT  1/2003 ([facility])    Port inserted in radiology    UPPER GASTROINTESTINAL ENDOSCOPY  02/15/2013    egd       Medications  Current Outpatient Medications   Medication Sig Dispense Refill    vitamin D (CHOLECALCIFEROL) 25 MCG (1000 UT) TABS tablet Take 1,000 Units by mouth daily      valACYclovir (VALTREX) 500 MG tablet Take 500 mg by mouth daily      cyanocobalamin 1000 MCG tablet Take 1,000 mcg by mouth daily      furosemide (LASIX) 20 MG tablet Take 20 mg by mouth as needed      tiZANidine (ZANAFLEX) 4 MG tablet Take 4 mg by mouth as needed      citalopram (CELEXA) 20 MG tablet Take 30 mg by mouth daily      dicyclomine (BENTYL) 20 MG tablet Take 20 mg by mouth as needed      sucralfate (CARAFATE) 1 GM tablet Take 1 g by mouth 3 times daily      gabapentin (NEURONTIN) 300 MG capsule Take 300 mg by mouth 2 times daily. Biotin 91715 MCG TABS Take 20,000 mcg by mouth daily      ferrous fumarate-vitamin c (TERI-SEQUELS) 65-25 MG TBCR CR tablet Take 1 tablet by mouth daily (with breakfast)      naproxen (NAPROSYN) 500 MG tablet Take 1 tablet by mouth 2 times daily as needed for Pain 14 tablet 0    omeprazole (PRILOSEC) 40 MG delayed release capsule Take 1 capsule by mouth every morning On empty stomach followed by a meal within 20 min 90 capsule 3     No current facility-administered medications for this visit.      Allergies  Allergies   Allergen Reactions    Vicodin [Hydrocodone-Acetaminophen] Nausea Only     Pupils dialated    Duricef [Cefadroxil] Nausea And Vomiting    Tape [Adhesive Tape] Rash       Family History  Family History   Problem Relation Age of Onset    Arthritis Mother     Heart Disease Mother     High Blood Pressure Father     Cancer Father         kidney  prostate       Social History  Social History     Socioeconomic History    Marital status:      Spouse name: Not on file    Number of children: Not on file    Years of education: Not on file    Highest education level: Not on file   Occupational History     Employer: [employer]   Tobacco Use    Smoking status: Never    Smokeless tobacco: Never   Vaping Use    Vaping Use: Never used   Substance and Sexual Activity    Alcohol use: No    Drug use: No    Sexual activity: Not on file   Other Topics Concern    Not on file   Social History Narrative    Not on file     Social Determinants of Health     Financial Resource Strain: Not on file   Food Insecurity: Not on file   Transportation Needs: Not on file   Physical Activity: Not on file   Stress: Not on file   Social Connections: Not on file   Intimate Partner Violence: Not on file   Housing Stability: Not on file         Review of Systems  Review of Systems   Constitutional:  Negative for chills, fatigue, fever and unexpected weight change. HENT:  Negative for sore throat, trouble swallowing and voice change. Eyes:  Negative for visual disturbance. Respiratory:  Negative for cough, shortness of breath and wheezing. Cardiovascular:  Negative for chest pain and palpitations. Gastrointestinal:  Positive for abdominal pain and diarrhea. Negative for blood in stool, nausea and vomiting. Endocrine: Negative for cold intolerance, heat intolerance and polydipsia. Genitourinary:  Negative for dysuria, flank pain and hematuria. Musculoskeletal:  Negative for arthralgias, gait problem, joint swelling and myalgias.    Skin: Negative for color change and rash. Allergic/Immunologic: Negative for immunocompromised state. Neurological:  Negative for dizziness, tremors, seizures and speech difficulty. Hematological:  Does not bruise/bleed easily. Psychiatric/Behavioral:  Negative for behavioral problems and confusion. OBJECTIVE     /70 (Site: Right Upper Arm, Position: Sitting, Cuff Size: Medium Adult)   Pulse 77   Temp 97.5 °F (36.4 °C)   Ht 5' 1.5\" (1.562 m)   Wt 153 lb (69.4 kg)   SpO2 96%   BMI 28.44 kg/m²     Physical Exam  Vitals reviewed. Constitutional:       Appearance: Normal appearance. She is well-developed. She is not ill-appearing. HENT:      Head: Normocephalic and atraumatic. Nose: No congestion. Eyes:      General: No scleral icterus. Extraocular Movements: Extraocular movements intact. Pupils: Pupils are equal, round, and reactive to light. Neck:      Thyroid: No thyromegaly. Vascular: No JVD. Trachea: No tracheal deviation. Cardiovascular:      Rate and Rhythm: Normal rate and regular rhythm. Heart sounds: Normal heart sounds. No murmur heard. Pulmonary:      Effort: No respiratory distress. Breath sounds: Normal breath sounds. No wheezing. Abdominal:      General: Bowel sounds are normal. There is no distension. Palpations: Abdomen is soft. There is no mass. Tenderness: There is no abdominal tenderness. There is no guarding or rebound. Hernia: No hernia is present. Musculoskeletal:         General: No tenderness or deformity. Cervical back: Neck supple. No rigidity. Lymphadenopathy:      Cervical: No cervical adenopathy. Skin:     General: Skin is warm and dry. Coloration: Skin is not jaundiced or pale. Findings: No rash. Neurological:      General: No focal deficit present. Mental Status: She is alert and oriented to person, place, and time. Cranial Nerves: No cranial nerve deficit.    Psychiatric: Mood and Affect: Mood normal.         Behavior: Behavior normal.       Lab Results   Component Value Date    WBC 6.9 11/17/2022    HGB 13.6 11/17/2022    HCT 40.9 11/17/2022     11/17/2022    CHOL 188 02/25/2017    TRIG 59 02/25/2017    HDL 55 02/25/2017    ALT 11 11/17/2022    AST 24 11/17/2022     11/17/2022    K 3.7 11/17/2022     11/17/2022    CREATININE 0.6 11/17/2022    BUN 25 (H) 11/17/2022    CO2 23 11/17/2022    TSH 1.330 02/25/2017          PROCEDURE: CT ABDOMEN PELVIS W IV CONTRAST       CLINICAL INFORMATION: Left sided abd pain; nausea/vom; left flank pain; hx diverticulitis & kidney stone . COMPARISON: CT scan of the abdomen and pelvis dated 2/28/2017. Katie Christie TECHNIQUE: Axial 5 mm CT images were obtained through the abdomen and pelvis after the administration of intravenous contrast. Coronal and sagittal reconstructions were obtained. All CT scans at this facility use dose modulation, iterative reconstruction, and/or weight-based dosing when appropriate to reduce radiation dose to as low as reasonably achievable. FINDINGS:           The visualized aspects of the lung bases are clear. The base of the heart is within appropriate limits. There is diffuse fatty replacement in the liver. . The spleen is normal. The adrenals and pancreas are normal. There are gallstones present. There is bilateral hydronephrosis. No renal masses are noted. There are postoperative changes at the gastroesophageal junction. There is a hiatal hernia. There are postoperative changes involving the small bowel loops. The IVC and aorta are of normal caliber. There is no adenopathy. There is punctate calcification adjacent to the left ureterovesical junction. . There is no pelvic free fluid. There is abnormal mucosal thickening in the colon. There is diverticulosis with no evidence for acute diverticulitis. .  There is no adenopathy.     There is lumbar spondylosis. Impression       1. Bilateral hydronephrosis. Tiny stone at the left ureterovesical junction. 2. Diffuse fatty replacement in the liver. 3. Gallstones. 4. Postoperative changes at the gastroesophageal junction. Hiatal hernia. 5. Postoperative changes involving the small bowel loops. 6. Abnormal mucosal thickening in the colon. Evidence of diverticulosis with no acute diverticulitis. 7. Lumbar spondylosis. **This report has been created using voice recognition software. It may contain minor errors which are inherent in voice recognition technology. **       Final report electronically signed by DR Norah Gallardo on 11/17/2022 12:36 PM                         Imaging reviewed

## 2022-12-03 ASSESSMENT — ENCOUNTER SYMPTOMS
SHORTNESS OF BREATH: 0
COLOR CHANGE: 0
DIARRHEA: 1
ABDOMINAL PAIN: 1
BLOOD IN STOOL: 0
SORE THROAT: 0
WHEEZING: 0
VOMITING: 0
NAUSEA: 0
VOICE CHANGE: 0
COUGH: 0
TROUBLE SWALLOWING: 0

## 2022-12-14 ENCOUNTER — HOSPITAL ENCOUNTER (OUTPATIENT)
Age: 62
Setting detail: OUTPATIENT SURGERY
Discharge: HOME | End: 2022-12-14
Attending: SURGERY | Admitting: SURGERY
Payer: COMMERCIAL

## 2022-12-14 ENCOUNTER — ANESTHESIA EVENT (OUTPATIENT)
Dept: OPERATING ROOM | Age: 62
End: 2022-12-14
Payer: COMMERCIAL

## 2022-12-14 ENCOUNTER — ANESTHESIA (OUTPATIENT)
Dept: OPERATING ROOM | Age: 62
End: 2022-12-14
Payer: COMMERCIAL

## 2022-12-14 VITALS
DIASTOLIC BLOOD PRESSURE: 67 MMHG | HEART RATE: 80 BPM | RESPIRATION RATE: 16 BRPM | HEIGHT: 62 IN | TEMPERATURE: 97.4 F | OXYGEN SATURATION: 97 % | BODY MASS INDEX: 28.89 KG/M2 | WEIGHT: 157 LBS | SYSTOLIC BLOOD PRESSURE: 141 MMHG

## 2022-12-14 DIAGNOSIS — K80.12 CALCULUS OF GALLBLADDER WITH ACUTE ON CHRONIC CHOLECYSTITIS WITHOUT OBSTRUCTION: Primary | ICD-10-CM

## 2022-12-14 DIAGNOSIS — K80.10 CALCULUS OF GALLBLADDER WITH CHOLECYSTITIS WITHOUT BILIARY OBSTRUCTION, UNSPECIFIED CHOLECYSTITIS ACUITY: ICD-10-CM

## 2022-12-14 PROCEDURE — 6360000002 HC RX W HCPCS: Performed by: SURGERY

## 2022-12-14 PROCEDURE — 2720000010 HC SURG SUPPLY STERILE: Performed by: SURGERY

## 2022-12-14 PROCEDURE — 7100000000 HC PACU RECOVERY - FIRST 15 MIN: Performed by: SURGERY

## 2022-12-14 PROCEDURE — 3600000019 HC SURGERY ROBOT ADDTL 15MIN: Performed by: SURGERY

## 2022-12-14 PROCEDURE — 88304 TISSUE EXAM BY PATHOLOGIST: CPT

## 2022-12-14 PROCEDURE — C1889 IMPLANT/INSERT DEVICE, NOC: HCPCS | Performed by: SURGERY

## 2022-12-14 PROCEDURE — 2500000003 HC RX 250 WO HCPCS: Performed by: NURSE ANESTHETIST, CERTIFIED REGISTERED

## 2022-12-14 PROCEDURE — 3700000001 HC ADD 15 MINUTES (ANESTHESIA): Performed by: SURGERY

## 2022-12-14 PROCEDURE — 3700000000 HC ANESTHESIA ATTENDED CARE: Performed by: SURGERY

## 2022-12-14 PROCEDURE — 7100000001 HC PACU RECOVERY - ADDTL 15 MIN: Performed by: SURGERY

## 2022-12-14 PROCEDURE — 2500000003 HC RX 250 WO HCPCS: Performed by: SURGERY

## 2022-12-14 PROCEDURE — 2580000003 HC RX 258: Performed by: SURGERY

## 2022-12-14 PROCEDURE — 3600000009 HC SURGERY ROBOT BASE: Performed by: SURGERY

## 2022-12-14 PROCEDURE — 7100000011 HC PHASE II RECOVERY - ADDTL 15 MIN: Performed by: SURGERY

## 2022-12-14 PROCEDURE — 6370000000 HC RX 637 (ALT 250 FOR IP): Performed by: ANESTHESIOLOGY

## 2022-12-14 PROCEDURE — S2900 ROBOTIC SURGICAL SYSTEM: HCPCS | Performed by: SURGERY

## 2022-12-14 PROCEDURE — 2709999900 HC NON-CHARGEABLE SUPPLY: Performed by: SURGERY

## 2022-12-14 PROCEDURE — 47563 LAPARO CHOLECYSTECTOMY/GRAPH: CPT | Performed by: SURGERY

## 2022-12-14 PROCEDURE — 6360000002 HC RX W HCPCS: Performed by: NURSE ANESTHETIST, CERTIFIED REGISTERED

## 2022-12-14 PROCEDURE — 6370000000 HC RX 637 (ALT 250 FOR IP): Performed by: SURGERY

## 2022-12-14 PROCEDURE — 7100000010 HC PHASE II RECOVERY - FIRST 15 MIN: Performed by: SURGERY

## 2022-12-14 DEVICE — CLIP INT L POLYMER LOK LIG HEM O LOK: Type: IMPLANTABLE DEVICE | Site: BILE DUCT | Status: FUNCTIONAL

## 2022-12-14 RX ORDER — DEXAMETHASONE SODIUM PHOSPHATE 10 MG/ML
INJECTION, EMULSION INTRAMUSCULAR; INTRAVENOUS PRN
Status: DISCONTINUED | OUTPATIENT
Start: 2022-12-14 | End: 2022-12-14 | Stop reason: SDUPTHER

## 2022-12-14 RX ORDER — SCOLOPAMINE TRANSDERMAL SYSTEM 1 MG/1
1 PATCH, EXTENDED RELEASE TRANSDERMAL
Status: DISCONTINUED | OUTPATIENT
Start: 2022-12-14 | End: 2022-12-14 | Stop reason: HOSPADM

## 2022-12-14 RX ORDER — FENTANYL CITRATE 50 UG/ML
25 INJECTION, SOLUTION INTRAMUSCULAR; INTRAVENOUS EVERY 5 MIN PRN
Status: DISCONTINUED | OUTPATIENT
Start: 2022-12-14 | End: 2022-12-14 | Stop reason: HOSPADM

## 2022-12-14 RX ORDER — OXYCODONE HYDROCHLORIDE AND ACETAMINOPHEN 5; 325 MG/1; MG/1
2 TABLET ORAL EVERY 4 HOURS PRN
Status: DISCONTINUED | OUTPATIENT
Start: 2022-12-14 | End: 2022-12-14 | Stop reason: HOSPADM

## 2022-12-14 RX ORDER — SODIUM CHLORIDE 0.9 % (FLUSH) 0.9 %
5-40 SYRINGE (ML) INJECTION EVERY 12 HOURS SCHEDULED
Status: DISCONTINUED | OUTPATIENT
Start: 2022-12-14 | End: 2022-12-14 | Stop reason: HOSPADM

## 2022-12-14 RX ORDER — SODIUM CHLORIDE 9 MG/ML
INJECTION, SOLUTION INTRAVENOUS CONTINUOUS
Status: DISCONTINUED | OUTPATIENT
Start: 2022-12-14 | End: 2022-12-14 | Stop reason: HOSPADM

## 2022-12-14 RX ORDER — PROPOFOL 10 MG/ML
INJECTION, EMULSION INTRAVENOUS PRN
Status: DISCONTINUED | OUTPATIENT
Start: 2022-12-14 | End: 2022-12-14 | Stop reason: SDUPTHER

## 2022-12-14 RX ORDER — ONDANSETRON 2 MG/ML
INJECTION INTRAMUSCULAR; INTRAVENOUS PRN
Status: DISCONTINUED | OUTPATIENT
Start: 2022-12-14 | End: 2022-12-14 | Stop reason: SDUPTHER

## 2022-12-14 RX ORDER — SODIUM CHLORIDE 0.9 % (FLUSH) 0.9 %
5-40 SYRINGE (ML) INJECTION PRN
Status: DISCONTINUED | OUTPATIENT
Start: 2022-12-14 | End: 2022-12-14 | Stop reason: HOSPADM

## 2022-12-14 RX ORDER — MORPHINE SULFATE 2 MG/ML
2 INJECTION, SOLUTION INTRAMUSCULAR; INTRAVENOUS
Status: DISCONTINUED | OUTPATIENT
Start: 2022-12-14 | End: 2022-12-14 | Stop reason: HOSPADM

## 2022-12-14 RX ORDER — LIDOCAINE HYDROCHLORIDE 20 MG/ML
INJECTION, SOLUTION EPIDURAL; INFILTRATION; INTRACAUDAL; PERINEURAL PRN
Status: DISCONTINUED | OUTPATIENT
Start: 2022-12-14 | End: 2022-12-14 | Stop reason: SDUPTHER

## 2022-12-14 RX ORDER — OXYCODONE HYDROCHLORIDE AND ACETAMINOPHEN 5; 325 MG/1; MG/1
1 TABLET ORAL EVERY 6 HOURS PRN
Qty: 20 TABLET | Refills: 0 | Status: SHIPPED | OUTPATIENT
Start: 2022-12-14 | End: 2022-12-19

## 2022-12-14 RX ORDER — EPHEDRINE SULFATE 50 MG/ML
INJECTION INTRAVENOUS PRN
Status: DISCONTINUED | OUTPATIENT
Start: 2022-12-14 | End: 2022-12-14 | Stop reason: SDUPTHER

## 2022-12-14 RX ORDER — ONDANSETRON 2 MG/ML
4 INJECTION INTRAMUSCULAR; INTRAVENOUS EVERY 6 HOURS PRN
Status: DISCONTINUED | OUTPATIENT
Start: 2022-12-14 | End: 2022-12-14 | Stop reason: HOSPADM

## 2022-12-14 RX ORDER — FENTANYL CITRATE 50 UG/ML
INJECTION, SOLUTION INTRAMUSCULAR; INTRAVENOUS PRN
Status: DISCONTINUED | OUTPATIENT
Start: 2022-12-14 | End: 2022-12-14 | Stop reason: SDUPTHER

## 2022-12-14 RX ORDER — KETOROLAC TROMETHAMINE 30 MG/ML
INJECTION, SOLUTION INTRAMUSCULAR; INTRAVENOUS PRN
Status: DISCONTINUED | OUTPATIENT
Start: 2022-12-14 | End: 2022-12-14 | Stop reason: SDUPTHER

## 2022-12-14 RX ORDER — SUCCINYLCHOLINE/SOD CL,ISO/PF 200MG/10ML
SYRINGE (ML) INTRAVENOUS PRN
Status: DISCONTINUED | OUTPATIENT
Start: 2022-12-14 | End: 2022-12-14 | Stop reason: SDUPTHER

## 2022-12-14 RX ORDER — FENTANYL CITRATE 50 UG/ML
50 INJECTION, SOLUTION INTRAMUSCULAR; INTRAVENOUS EVERY 5 MIN PRN
Status: DISCONTINUED | OUTPATIENT
Start: 2022-12-14 | End: 2022-12-14 | Stop reason: HOSPADM

## 2022-12-14 RX ORDER — SODIUM CHLORIDE 9 MG/ML
INJECTION, SOLUTION INTRAVENOUS PRN
Status: DISCONTINUED | OUTPATIENT
Start: 2022-12-14 | End: 2022-12-14 | Stop reason: HOSPADM

## 2022-12-14 RX ORDER — ONDANSETRON 4 MG/1
4 TABLET, FILM COATED ORAL 3 TIMES DAILY PRN
Qty: 15 TABLET | Refills: 0 | Status: SHIPPED | OUTPATIENT
Start: 2022-12-14

## 2022-12-14 RX ORDER — INDOCYANINE GREEN AND WATER 25 MG
1.25 KIT INJECTION ONCE
Status: COMPLETED | OUTPATIENT
Start: 2022-12-14 | End: 2022-12-14

## 2022-12-14 RX ORDER — MIDAZOLAM HYDROCHLORIDE 1 MG/ML
INJECTION INTRAMUSCULAR; INTRAVENOUS PRN
Status: DISCONTINUED | OUTPATIENT
Start: 2022-12-14 | End: 2022-12-14 | Stop reason: SDUPTHER

## 2022-12-14 RX ORDER — ONDANSETRON 2 MG/ML
4 INJECTION INTRAMUSCULAR; INTRAVENOUS
Status: DISCONTINUED | OUTPATIENT
Start: 2022-12-14 | End: 2022-12-14 | Stop reason: HOSPADM

## 2022-12-14 RX ORDER — MORPHINE SULFATE 2 MG/ML
4 INJECTION, SOLUTION INTRAMUSCULAR; INTRAVENOUS
Status: DISCONTINUED | OUTPATIENT
Start: 2022-12-14 | End: 2022-12-14 | Stop reason: HOSPADM

## 2022-12-14 RX ORDER — ACETAMINOPHEN 325 MG/1
650 TABLET ORAL EVERY 4 HOURS PRN
Status: DISCONTINUED | OUTPATIENT
Start: 2022-12-14 | End: 2022-12-14 | Stop reason: HOSPADM

## 2022-12-14 RX ORDER — ONDANSETRON 4 MG/1
4 TABLET, ORALLY DISINTEGRATING ORAL EVERY 8 HOURS PRN
Status: DISCONTINUED | OUTPATIENT
Start: 2022-12-14 | End: 2022-12-14 | Stop reason: HOSPADM

## 2022-12-14 RX ORDER — ROCURONIUM BROMIDE 10 MG/ML
INJECTION, SOLUTION INTRAVENOUS PRN
Status: DISCONTINUED | OUTPATIENT
Start: 2022-12-14 | End: 2022-12-14 | Stop reason: SDUPTHER

## 2022-12-14 RX ORDER — MEPERIDINE HYDROCHLORIDE 25 MG/ML
12.5 INJECTION INTRAMUSCULAR; INTRAVENOUS; SUBCUTANEOUS EVERY 5 MIN PRN
Status: DISCONTINUED | OUTPATIENT
Start: 2022-12-14 | End: 2022-12-14 | Stop reason: HOSPADM

## 2022-12-14 RX ORDER — OXYCODONE HYDROCHLORIDE AND ACETAMINOPHEN 5; 325 MG/1; MG/1
1 TABLET ORAL EVERY 4 HOURS PRN
Status: DISCONTINUED | OUTPATIENT
Start: 2022-12-14 | End: 2022-12-14 | Stop reason: HOSPADM

## 2022-12-14 RX ORDER — BUPIVACAINE HYDROCHLORIDE AND EPINEPHRINE 5; 5 MG/ML; UG/ML
INJECTION, SOLUTION EPIDURAL; INTRACAUDAL; PERINEURAL PRN
Status: DISCONTINUED | OUTPATIENT
Start: 2022-12-14 | End: 2022-12-14 | Stop reason: ALTCHOICE

## 2022-12-14 RX ADMIN — EPHEDRINE SULFATE 10 MG: 50 INJECTION, SOLUTION INTRAVENOUS at 11:40

## 2022-12-14 RX ADMIN — CEFOXITIN 2000 MG: 2 INJECTION, POWDER, FOR SOLUTION INTRAVENOUS at 11:31

## 2022-12-14 RX ADMIN — FENTANYL CITRATE 100 MCG: 50 INJECTION, SOLUTION INTRAMUSCULAR; INTRAVENOUS at 11:28

## 2022-12-14 RX ADMIN — ROCURONIUM BROMIDE 30 MG: 50 INJECTION, SOLUTION INTRAVENOUS at 11:36

## 2022-12-14 RX ADMIN — EPHEDRINE SULFATE 5 MG: 50 INJECTION, SOLUTION INTRAVENOUS at 11:38

## 2022-12-14 RX ADMIN — SODIUM CHLORIDE: 9 INJECTION, SOLUTION INTRAVENOUS at 10:24

## 2022-12-14 RX ADMIN — KETOROLAC TROMETHAMINE 30 MG: 30 INJECTION, SOLUTION INTRAMUSCULAR; INTRAVENOUS at 12:02

## 2022-12-14 RX ADMIN — ONDANSETRON 4 MG: 2 INJECTION INTRAMUSCULAR; INTRAVENOUS at 11:39

## 2022-12-14 RX ADMIN — OXYCODONE AND ACETAMINOPHEN 1 TABLET: 5; 325 TABLET ORAL at 13:42

## 2022-12-14 RX ADMIN — DEXAMETHASONE SODIUM PHOSPHATE 8 MG: 10 INJECTION, EMULSION INTRAMUSCULAR; INTRAVENOUS at 11:39

## 2022-12-14 RX ADMIN — Medication 1.25 MG: at 10:28

## 2022-12-14 RX ADMIN — MIDAZOLAM 2 MG: 1 INJECTION INTRAMUSCULAR; INTRAVENOUS at 11:24

## 2022-12-14 RX ADMIN — PROPOFOL 130 MG: 10 INJECTION, EMULSION INTRAVENOUS at 11:28

## 2022-12-14 RX ADMIN — FENTANYL CITRATE 50 MCG: 50 INJECTION, SOLUTION INTRAMUSCULAR; INTRAVENOUS at 12:24

## 2022-12-14 RX ADMIN — ROCURONIUM BROMIDE 10 MG: 50 INJECTION, SOLUTION INTRAVENOUS at 11:51

## 2022-12-14 RX ADMIN — SUGAMMADEX 200 MG: 100 INJECTION, SOLUTION INTRAVENOUS at 12:16

## 2022-12-14 RX ADMIN — ONDANSETRON 4 MG: 2 INJECTION INTRAMUSCULAR; INTRAVENOUS at 13:47

## 2022-12-14 RX ADMIN — LIDOCAINE HYDROCHLORIDE 100 MG: 20 INJECTION, SOLUTION EPIDURAL; INFILTRATION; INTRACAUDAL; PERINEURAL at 11:28

## 2022-12-14 RX ADMIN — Medication 120 MG: at 11:28

## 2022-12-14 ASSESSMENT — PAIN SCALES - GENERAL
PAINLEVEL_OUTOF10: 8
PAINLEVEL_OUTOF10: 5
PAINLEVEL_OUTOF10: 5
PAINLEVEL_OUTOF10: 0
PAINLEVEL_OUTOF10: 5

## 2022-12-14 ASSESSMENT — PAIN DESCRIPTION - LOCATION
LOCATION: ABDOMEN

## 2022-12-14 ASSESSMENT — PAIN DESCRIPTION - PAIN TYPE
TYPE: SURGICAL PAIN
TYPE: SURGICAL PAIN

## 2022-12-14 ASSESSMENT — PAIN - FUNCTIONAL ASSESSMENT: PAIN_FUNCTIONAL_ASSESSMENT: 0-10

## 2022-12-14 NOTE — PROGRESS NOTES
1233- Pt to PACU. 4 lap sites with surgical glue. Pt with OPA in place. Opens eyes briefly to voice. 1240- Pt denies pain and nausea. 1245- Resting with eyes closed. Denies needs. No acute distress noted. 1304- Pt met PACU discharge criteria, transferred to [facility].

## 2022-12-14 NOTE — PROGRESS NOTES
Pt returned to [facility] room 1. Vitals and assessment as charted. 0.9 infusing from PACU. Pt has crackers and water. Family at the bedside. Pt and family verbalized understanding of discharge criteria and call light use. Call light in reach.

## 2022-12-14 NOTE — PROGRESS NOTES

## 2022-12-14 NOTE — ANESTHESIA PRE PROCEDURE
Department of Anesthesiology  Preprocedure Note       Name:  Derrek Barragan   Age:  65 y.o.  :  1960                                          MRN:  [id number]         Date:  2022      Surgeon: Lili Mack):  Carleen Red MD    Procedure: Procedure(s):  ROBOTIC VIVI, POSS OPEN    Medications prior to admission:   Prior to Admission medications    Medication Sig Start Date End Date Taking? Authorizing Provider   vitamin D (CHOLECALCIFEROL) 25 MCG (1000 UT) TABS tablet Take 1,000 Units by mouth daily  Patient not taking: Reported on 2022    Historical Provider, MD   valACYclovir (VALTREX) 500 MG tablet Take 500 mg by mouth daily    Historical Provider, MD   cyanocobalamin 1000 MCG tablet Take 1,000 mcg by mouth daily    Historical Provider, MD   furosemide (LASIX) 20 MG tablet Take 20 mg by mouth as needed Only takes when flying    Historical Provider, MD   tiZANidine (ZANAFLEX) 4 MG tablet Take 4 mg by mouth as needed    Historical Provider, MD   citalopram (CELEXA) 20 MG tablet Take 30 mg by mouth daily    Historical Provider, MD   dicyclomine (BENTYL) 20 MG tablet Take 20 mg by mouth as needed    Historical Provider, MD   sucralfate (CARAFATE) 1 GM tablet Take 1 g by mouth 3 times daily    Historical Provider, MD   gabapentin (NEURONTIN) 300 MG capsule Take 300 mg by mouth 2 times daily.     Historical Provider, MD   Biotin 43411 MCG TABS Take 20,000 mcg by mouth daily    Historical Provider, MD   ferrous fumarate-vitamin c (TERI-SEQUELS) 65-25 MG TBCR CR tablet Take 1 tablet by mouth daily (with breakfast)    Historical Provider, MD   omeprazole (PRILOSEC) 40 MG delayed release capsule Take 1 capsule by mouth every morning On empty stomach followed by a meal within 20 min 10/31/16   Poncho Lara MD       Current medications:    Current Facility-Administered Medications   Medication Dose Route Frequency Provider Last Rate Last Admin    sodium chloride flush 0.9 % injection 5-40 mL  5-40 mL rt colectomy-Dr. Pérez Files COLONOSCOPY  12/11/2012    Dr. Veronica George  2003    Dr. Bashir Mcknight, ESOPHAGUS  2013    colon resection    [facility]    x2    ENDOSCOPY, COLON, DIAGNOSTIC  12/11/2012    Dr. Enriquez Goods Right 11/07/2013    1st metatarsal cuneiform fusion, Weil osteotomy 2nd-os staple     FOOT SURGERY  1999    HERNIA REPAIR  03/01/2016    robot hiatal hernia with fundoplication    HIATAL HERNIA REPAIR  03/2016    KNEE ARTHROSCOPY Right 06/2016    cortizone shots left knee    LAPAROSCOPY  2000    right oopherectomy-Dr. Cordelia Zamarripa OTHER SURGICAL HISTORY  09/2003    Port removal-Dr. Ordaz    OVARY REMOVAL Right 2000    TONSILLECTOMY  1979    TUNNELED VENOUS PORT PLACEMENT  1/2003 ([place])    Port inserted in radiology    UPPER GASTROINTESTINAL ENDOSCOPY  02/15/2013    egd       Social History:    Social History     Tobacco Use    Smoking status: Never    Smokeless tobacco: Never   Substance Use Topics    Alcohol use: No                                Counseling given: Not Answered      Vital Signs (Current):   Vitals:    12/14/22 0957 12/14/22 0958   BP: (!) 122/58    Pulse: 80    Resp: 16    Temp: 97.7 °F (36.5 °C)    SpO2: 96%    Weight:  157 lb (71.2 kg)   Height:  5' 2\" (1.575 m)                                              BP Readings from Last 3 Encounters:   12/14/22 (!) 122/58   12/01/22 122/70   11/17/22 (!) 143/74       NPO Status: Time of last liquid consumption: 2200                        Time of last solid consumption: 1830                        Date of last liquid consumption: 12/13/22                        Date of last solid food consumption: 12/13/22    BMI:   Wt Readings from Last 3 Encounters:   12/14/22 157 lb (71.2 kg)   12/01/22 153 lb (69.4 kg)   11/17/22 153 lb (69.4 kg)     Body mass index is 28.72 kg/m².     CBC:   Lab Results   Component Value Date/Time    WBC 6.9 11/17/2022 11:36 AM RBC 4.25 11/17/2022 11:36 AM    HGB 13.6 11/17/2022 11:36 AM    HCT 40.9 11/17/2022 11:36 AM    MCV 96.2 11/17/2022 11:36 AM    RDW 13.2 09/14/2016 11:27 AM     11/17/2022 11:36 AM       CMP:   Lab Results   Component Value Date/Time     11/17/2022 11:36 AM    K 3.7 11/17/2022 11:36 AM     11/17/2022 11:36 AM    CO2 23 11/17/2022 11:36 AM    BUN 25 11/17/2022 11:36 AM    CREATININE 0.6 11/17/2022 11:36 AM    LABGLOM >60 11/17/2022 11:38 AM    GLUCOSE 123 11/17/2022 11:36 AM    PROT 6.7 11/17/2022 11:36 AM    CALCIUM 8.6 11/17/2022 11:36 AM    BILITOT 0.7 11/17/2022 11:36 AM    ALKPHOS 77 11/17/2022 11:36 AM    AST 24 11/17/2022 11:36 AM    ALT 11 11/17/2022 11:36 AM       POC Tests: No results for input(s): POCGLU, POCNA, POCK, POCCL, POCBUN, POCHEMO, POCHCT in the last 72 hours. Coags: No results found for: PROTIME, INR, APTT    HCG (If Applicable): No results found for: PREGTESTUR, PREGSERUM, HCG, HCGQUANT     ABGs: No results found for: PHART, PO2ART, EVS9FVS, UZG0ZYR, BEART, P0MTTJPX     Type & Screen (If Applicable):  Lab Results   Component Value Date    LABRH NEG 01/17/2013       Drug/Infectious Status (If Applicable):  No results found for: HIV, HEPCAB    COVID-19 Screening (If Applicable): No results found for: COVID19        Anesthesia Evaluation  Patient summary reviewed and Nursing notes reviewed   history of anesthetic complications: PONV.   Airway: Mallampati: II  TM distance: >3 FB   Neck ROM: full  Mouth opening: > = 3 FB   Dental:          Pulmonary:Negative Pulmonary ROS and normal exam  breath sounds clear to auscultation                             Cardiovascular:Negative CV ROS  Exercise tolerance: good (>4 METS),         ECG reviewed                        Neuro/Psych:   Negative Neuro/Psych ROS              GI/Hepatic/Renal:   (+) hiatal hernia,           Endo/Other: Negative Endo/Other ROS             Pt had no PAT visit       Abdominal:             Vascular: negative vascular ROS. Other Findings:           Anesthesia Plan      general     ASA 2       Induction: intravenous. MIPS: Postoperative opioids intended and Prophylactic antiemetics administered. Anesthetic plan and risks discussed with patient. Plan discussed with CRNA.                     [address],    12/14/2022

## 2022-12-14 NOTE — DISCHARGE INSTRUCTIONS
DR NERI'S DISCHARGE INSTRUCTIONS    Pt Name: [id number] [facility] Record Number: [id number]  Today's Date: 12/14/2022    GENERAL ANESTHESIA OR SEDATION  1. Do not drive or operate hazardous machinery for 24 hours. 2. Do not make important business or personal decisions for 24 hours. 3. Do not drink alcoholic beverages or use tobacco for 24 hours. ACTIVITY INSTRUCTIONS:  [] Rest today. Resume light to normal activity tomorrow.   [] You may resume normal activity tomorrow. Do not engage in strenuous activity that may place stress on your incision. [x] Do not drive for 3-5 days and avoid heavy lifting, tugging, pullings greater than 10-20 lbs until seen in the office. DIET INSTRUCTIONS:  []Begin with clear liquids. If not nauseated, may increase to a low-fat diet when you desire. Greasy and spicy foods are not advised. [x]Regular diet as tolerated. []Other:     MEDICATIONS  [x]Prescription sent with you to be used as directed. []Lortab   []Norco   [x]Percocet, Zofran   []Tylenol #3   []Oxycontin   Do not drink alcohol or drive while taking these medications. You may experience dizziness or drowsiness with these medications. You may also experience constipation which can be relieved with stool softners or laxatives. [x]You may resume your daily prescription medication schedule unless otherwise specified. [x]Do not take 325mg Aspirin or other blood thinners such as Coumadin or Plavix for 5 days. WOUND/DRESSING INSTRUCTIONS:  Always ensure you and your care giver clean hands before and after caring for the wound. [] Keep dressing clean and dry for 48 hours. Change when soiled or wet. [] Allow steri-strips to fall off on their own.   [] Ice operative site for 20 minutes 4 times a day. [x] May wash over incision in shower in 24 hours, but do not soak in a bath.  [] Take sitz bath for 20 minutes twice daily and after bowel movements. [] Keep the abdominal binder in place during the day.  May remove to shower and at night.  [] Remove packing from wound in 24 hours and replace with AMD dressings daily. [] Empty CALLUM drain daily and record the amounts. BREAST PROCEDURES  []Following a breast procedure, it is important to continue to where supportive garments. []Following a sentinal lymph node biopsy, you should not be alarmed if your urine has a blue color to it. This is your body eliminating the dye used for the procedure. ABDOMINAL/LAPAROSCOPIC SURGERY  [x]You are encouraged to get up and move around as this helps with the circulation and speeds up the healing process. [x]Breath deeply and cough from time to time. This helps to clear your lungs and helps prevent pneumonia. [x]Supporting your incision with a pillow or your hand helps to minimize discomfort and pain. [x]Laparoscopic patients may develop shoulder pain in the first 48 hours from the gas used during the procedure. FOLLOW-UP CARE. SPECIFICALLY WATCH FOR:   Fever over 101 degrees by mouth   Increased redness, warmth, hardness at operative site. Blood soaked dressing (small amounts of oozing may be normal.)   Increased or progressive drainage from the surgical area   Inability to urinate or blood in the urine   Pain not relieved by the medications ordered   Persistent nausea and/or vomiting, unable to retain fluids. FOLLOW-UP APPOINTMENT   []1 week   [x]2 weeks-3 weeks   []Other    Call my office if you have any problem that concerns you [phone]. After hours, you can reach the answering service via the office phone number. IF YOU NEED IMMEDIATE ATTENTION, GO TO THE EMERGENCY ROOM AND YOUR DOCTOR WILL BE CONTACTED. Eric Henriquez MD MD  [address] [place] [phone]  Dayla Dense, [address]  Electronically signed 12/14/2022 at 12:27 PM

## 2022-12-14 NOTE — OP NOTE
[facility]  Operative Report    PATIENT NAME: Dileep Martel RECORD NO. [id number]  SURGEON: Paulette Kate MD   Primary Care Physician: Sara Black,   Date: 12/14/2022, 12:28 PM     PROCEDURE PERFORMED: 1.  Robotic assisted laparoscopic cholecystectomy with ICG green cholangiography 2. Extensive lysis of upper abdominal omental adhesions. PREOPERATIVE DIAGNOSIS:   Active Hospital Problems    Diagnosis Date Noted    Calculus of gallbladder with cholecystitis without biliary obstruction [K80.10] 12/14/2022     Priority: Medium      POSTOPERATIVE DIAGNOSIS: Same  SURGEON:  Paulette Kate MD   ANESTHESIA:  General endotracheal anesthesia and local  ANESTHESIA:  30 ml OF 0.5% Marcaine with epinephrine   ESTIMATED BLOOD LOSS:  40  ml  SPECIMEN: Gallbladder to pathology  COMPLICATIONS:  None; patient tolerated the procedure well. DRAINS: none  DISPOSITION: Recovery Room  CONDITION: stable      Narrative:    Acacian see history and physical examination. Patient with prior hiatal hernia repair x2 presented with biliary colic. Imaging studies show a large stone impacted in the neck of the gallbladder. Preoperative liver function test within normal limits. Procedure: Patient was brought the operating suite placed supine on the operating table. She had pneumatic sequential compression devices on the lower extremities. She had been given ICG green dye intravenously for cholangiography. She was given Mefoxin intravenously. After induction of general anesthesia the patient's abdomen was prepped and draped. Timeout was performed. Incision was made below the umbilicus carried down the fascia which was elevated and incised. 12 mm port was inserted CO2 pneumoperitoneum was introduced. In the mid abdomen there were omental adhesions up to the anterior abdominal wall the right side was clear. An 8 mm port was placed on the right side of the abdomen a 5 mm subcostal port was placed at that point. Robot was docked from over the patient's right side and I returned to the console through those ports the omental adhesions were taken down and then an 8 mm port was inserted towards the epigastrium. Fourth arm of the robot was docked. Patient had been placed in reverse Trendelenburg and turned with the left side down. After the omental adhesions have been completely taken down gallbladder was grasped and retracted upwards over the liver edge there were extensive omental adhesions to the gallbladder itself and these were carefully taken down. Downward lateral traction were applied to the infundibulum. Cystic duct was cleared from surrounding tissue clipped proximally distally and divided. Artery identified behind clipped proximally distally and divided between Hem-o-sean clips. ICG green cholangiography was obtained with firefly throughout. There is no evidence of bile leak. No obstruction of the common duct visualized. I scrubbed back into the patient's bedside. Powdered Surgicel had been placed into the liver bed. Gallbladder was placed in an Endo Catch type bag after the instruments were removed and robot undocked and removed from the abdominal cavity. Right upper quadrant had been copiously irrigated and effluent returned clear. The infraumbilical fascia was closed with 0 Ethibond sutures. Skin incisions were closed with subcuticular Vicryl suture. Skin glue was applied. Patient was hemodynamic stable throughout the procedure and transported to the recovery area spontaneously breathing in stable condition.

## 2022-12-14 NOTE — H&P
[address] [address]  History and Physical Update    Pt Name: Katie Mobley  MRN: [id number]  YOB: 1960  Date of evaluation: 12/14/2022    [x] I have examined the patient and reviewed the H&P/Consult and there are no changes to the patient or plans. [] I have examined the patient and reviewed the H&P/Consult and have noted the following changes:        Vanda Parr MD MD  Electronically signed 12/14/2022 at 10:33 AM   Vanda Parr MD   General Surgery  New Patient Evaluation in Office  Pt Name: Katie Mobley  Date of Birth 1960   Today's Date: 12/1/2022  Medical Record Number: [id number]  Referring Provider: No ref. provider found  Primary Care Provider: Kathleen Sharma DO  Chief Complaint:       Chief Complaint   Patient presents with    Surgical Consult       New patient- referred by Dr. Konrad Skinner - Gallstones, GERD, LLQ abdominal pain      ASSESSMENT   1. Calculus of gallbladder without cholecystitis without obstruction    2. Osteoporosis, unspecified osteoporosis type, unspecified pathological fracture presence    3. Esophageal stenosis    4. Recent passed renal stone   5. GERD symptoms. 6.  IBS  7. Previous hiatal hernia repair with Nissen fundoplication and redo at [address] [place] [address]  PLANS   Schedule Susie Blades for robotic assisted laparoscopic cholecystectomy possible open  The risks, options andalternatives were discussed at length with the patient. The risks including bleeding, infection, reoperation, bile duct injury and possible conversion to an open procedure were covered as well as nonresolution of pain. The possibility of other unforeseen complications including bile leak were also mentioned. We also discussed the conduct of the operation, probable outpatient stay postoperatively and the typical post operative recovery andrestrictions. Potential exacerbation of diarrhea postoperatively discussed as well. The patients questions were answered.  After this discussion, the patient [address]   2002     left lumpectomy-Dr. Rosy Deras   2012     RT-Dr. Delmar Almeida    CARPAL TUNNEL RELEASE Bilateral 2019     SECTION        SECTION   1989    COLECTOMY   2013     robotic assisted rt colectomy-Dr. Jerald Opitz    COLONOSCOPY   2012     Dr. Melva Williamson        Dr. Gao Hidden, ESOPHAGUS        colon resection    [address]     x2    ENDOSCOPY, COLON, DIAGNOSTIC   2012     Dr. Clair Conley Right 2013     1st metatarsal cuneiform fusion, Weil osteotomy 2nd-os staple     [place]   2016     robot hiatal hernia with fundoplication    HIATAL HERNIA REPAIR   2016    KNEE ARTHROSCOPY Right 2016     cortizone shots left knee    LAPAROSCOPY        right oopherectomy-Dr. Maral Garcia    OTHER SURGICAL HISTORY   2003     Port removal-Dr. Ordaz    OVARY REMOVAL Right     TONSILLECTOMY   1979    TUNNELED VENOUS PORT PLACEMENT   2003 ([place])     Port inserted in radiology    UPPER GASTROINTESTINAL ENDOSCOPY   02/15/2013     egd          Medications  Current Facility-Administered Medications          Current Outpatient Medications   Medication Sig Dispense Refill    vitamin D (CHOLECALCIFEROL) 25 MCG (1000 UT) TABS tablet Take 1,000 Units by mouth daily        valACYclovir (VALTREX) 500 MG tablet Take 500 mg by mouth daily        cyanocobalamin 1000 MCG tablet Take 1,000 mcg by mouth daily        furosemide (LASIX) 20 MG tablet Take 20 mg by mouth as needed        tiZANidine (ZANAFLEX) 4 MG tablet Take 4 mg by mouth as needed        citalopram (CELEXA) 20 MG tablet Take 30 mg by mouth daily        dicyclomine (BENTYL) 20 MG tablet Take 20 mg by mouth as needed        sucralfate (CARAFATE) 1 GM tablet Take 1 g by mouth 3 times daily        gabapentin (NEURONTIN) 300 MG capsule Take 300 mg by mouth 2 times daily. Biotin 57253 MCG TABS Take 20,000 mcg by mouth daily        ferrous fumarate-vitamin c (TERI-SEQUELS) 65-25 MG TBCR CR tablet Take 1 tablet by mouth daily (with breakfast)        naproxen (NAPROSYN) 500 MG tablet Take 1 tablet by mouth 2 times daily as needed for Pain 14 tablet 0    omeprazole (PRILOSEC) 40 MG delayed release capsule Take 1 capsule by mouth every morning On empty stomach followed by a meal within 20 min 90 capsule 3      No current facility-administered medications for this visit.          Allergies        Allergies   Allergen Reactions    Vicodin [Hydrocodone-Acetaminophen] Nausea Only       Pupils dialated    Duricef [Cefadroxil] Nausea And Vomiting    Tape [Adhesive Tape] Rash       Family History  Family History         Family History   Problem Relation Age of Onset    Arthritis Mother      Heart Disease Mother      High Blood Pressure Father      Cancer Father           kidney  prostate          Social History  Social History               Socioeconomic History    Marital status:        Spouse name: Not on file    Number of children: Not on file    Years of education: Not on file    Highest education level: Not on file   Occupational History       Employer: [employer]   Tobacco Use    Smoking status: Never    Smokeless tobacco: Never   Vaping Use    Vaping Use: Never used   Substance and Sexual Activity    Alcohol use: No    Drug use: No    Sexual activity: Not on file   Other Topics Concern    Not on file   Social History Narrative    Not on file      Social Determinants of Health      Financial Resource Strain: Not on file   Food Insecurity: Not on file   Transportation Needs: Not on file   Physical Activity: Not on file   Stress: Not on file   Social Connections: Not on file   Intimate Partner Violence: Not on file   Housing Stability: Not on file             Review of Systems  Review of Systems Constitutional:  Negative for chills, fatigue, fever and unexpected weight change. HENT:  Negative for sore throat, trouble swallowing and voice change. Eyes:  Negative for visual disturbance. Respiratory:  Negative for cough, shortness of breath and wheezing. Cardiovascular:  Negative for chest pain and palpitations. Gastrointestinal:  Positive for abdominal pain and diarrhea. Negative for blood in stool, nausea and vomiting. Endocrine: Negative for cold intolerance, heat intolerance and polydipsia. Genitourinary:  Negative for dysuria, flank pain and hematuria. Musculoskeletal:  Negative for arthralgias, gait problem, joint swelling and myalgias. Skin:  Negative for color change and rash. Allergic/Immunologic: Negative for immunocompromised state. Neurological:  Negative for dizziness, tremors, seizures and speech difficulty. Hematological:  Does not bruise/bleed easily. Psychiatric/Behavioral:  Negative for behavioral problems and confusion. OBJECTIVE      /70 (Site: Right Upper Arm, Position: Sitting, Cuff Size: Medium Adult)   Pulse 77   Temp 97.5 °F (36.4 °C)   Ht 5' 1.5\" (1.562 m)   Wt 153 lb (69.4 kg)   SpO2 96%   BMI 28.44 kg/m²      Physical Exam  Vitals reviewed. Constitutional:       Appearance: Normal appearance. She is well-developed. She is not ill-appearing. HENT:      Head: Normocephalic and atraumatic. Nose: No congestion. Eyes:      General: No scleral icterus. Extraocular Movements: Extraocular movements intact. Pupils: Pupils are equal, round, and reactive to light. Neck:      Thyroid: No thyromegaly. Vascular: No JVD. Trachea: No tracheal deviation. Cardiovascular:      Rate and Rhythm: Normal rate and regular rhythm. Heart sounds: Normal heart sounds. No murmur heard. Pulmonary:      Effort: No respiratory distress. Breath sounds: Normal breath sounds. No wheezing.    Abdominal:      General: Bowel sounds are normal. There is no distension. Palpations: Abdomen is soft. There is no mass. Tenderness: There is no abdominal tenderness. There is no guarding or rebound. Hernia: No hernia is present. Musculoskeletal:         General: No tenderness or deformity. Cervical back: Neck supple. No rigidity. Lymphadenopathy:      Cervical: No cervical adenopathy. Skin:     General: Skin is warm and dry. Coloration: Skin is not jaundiced or pale. Findings: No rash. Neurological:      General: No focal deficit present. Mental Status: She is alert and oriented to person, place, and time. Cranial Nerves: No cranial nerve deficit. Psychiatric:         Mood and Affect: Mood normal.         Behavior: Behavior normal.               Lab Results   Component Value Date     WBC 6.9 11/17/2022     HGB 13.6 11/17/2022     HCT 40.9 11/17/2022      11/17/2022     CHOL 188 02/25/2017     TRIG 59 02/25/2017     HDL 55 02/25/2017     ALT 11 11/17/2022     AST 24 11/17/2022      11/17/2022     K 3.7 11/17/2022      11/17/2022     CREATININE 0.6 11/17/2022     BUN 25 (H) 11/17/2022     CO2 23 11/17/2022     TSH 1.330 02/25/2017             PROCEDURE: CT ABDOMEN PELVIS W IV CONTRAST       CLINICAL INFORMATION: Left sided abd pain; nausea/vom; left flank pain; hx diverticulitis & kidney stone . COMPARISON: CT scan of the abdomen and pelvis dated 2/28/2017. [facility] TECHNIQUE: Axial 5 mm CT images were obtained through the abdomen and pelvis after the administration of intravenous contrast. Coronal and sagittal reconstructions were obtained. All CT scans at this facility use dose modulation, iterative reconstruction, and/or weight-based dosing when appropriate to reduce radiation dose to as low as reasonably achievable. FINDINGS:           The visualized aspects of the lung bases are clear. The base of the heart is within appropriate limits.        There is diffuse fatty replacement in the liver. . The spleen is normal. The adrenals and pancreas are normal. There are gallstones present. There is bilateral hydronephrosis. No renal masses are noted. There are postoperative changes at the gastroesophageal junction. There is a hiatal hernia. There are postoperative changes involving the small bowel loops. The IVC and aorta are of normal caliber. There is no adenopathy. There is punctate calcification adjacent to the left ureterovesical junction. . There is no pelvic free fluid. There is abnormal mucosal thickening in the colon. There is diverticulosis with no evidence for acute diverticulitis. .  There is no adenopathy. There is lumbar spondylosis. Impression       1. Bilateral hydronephrosis. Tiny stone at the left ureterovesical junction. 2. Diffuse fatty replacement in the liver. 3. Gallstones. 4. Postoperative changes at the gastroesophageal junction. Hiatal hernia. 5. Postoperative changes involving the small bowel loops. 6. Abnormal mucosal thickening in the colon. Evidence of diverticulosis with no acute diverticulitis. 7. Lumbar spondylosis. **This report has been created using voice recognition software. It may contain minor errors which are inherent in voice recognition technology. **       Final report electronically signed by DR Geovanna Painter on 11/17/2022 12:36 PM

## 2022-12-15 ENCOUNTER — TELEPHONE (OUTPATIENT)
Dept: SURGERY | Age: 62
End: 2022-12-15

## 2022-12-15 NOTE — TELEPHONE ENCOUNTER
S/p 1.  Robotic assisted laparoscopic cholecystectomy with ICG green cholangiography 2. Extensive lysis of upper abdominal omental adhesions-12/14/2022 Called to check on patient doing well. No nausea or vomiting. No issues with urinating. Eating and drinking fine. Incisions look good. Instructed to call with any issues.

## 2022-12-15 NOTE — ANESTHESIA POSTPROCEDURE EVALUATION
Department of Anesthesiology  Postprocedure Note    Patient: Noemi Brothers  MRN: [id number]  YOB: 1960  Date of evaluation: 12/15/2022      Procedure Summary     Date: 12/14/22 Room / Location: [address] [place] [address] KIRILL Martinez    Anesthesia Start: 1124 Anesthesia Stop: 1596    Procedure: ROBOTIC CHOLECYSTECTOMY (Abdomen) Diagnosis:       Calculus of gallbladder with cholecystitis without biliary obstruction, unspecified cholecystitis acuity      (Calculus of gallbladder with cholecystitis without biliary obstruction, unspecified cholecystitis acuity [K80.10])    Surgeons: Mitch Solomon MD Responsible Provider: Sofi Koenig DO    Anesthesia Type: general ASA Status: 2          Anesthesia Type: No value filed.     Reji Phase I: Reji Score: 9    Reij Phase II: Reji Score: 10      Anesthesia Post Evaluation    Patient location during evaluation: PACU  Patient participation: complete - patient participated  Level of consciousness: awake and alert  Pain score: 4  Airway patency: patent  Nausea & Vomiting: no nausea and no vomiting  Complications: no  Cardiovascular status: hemodynamically stable and blood pressure returned to baseline  Respiratory status: spontaneous ventilation, room air and acceptable  Hydration status: stable

## 2023-03-09 ENCOUNTER — OFFICE VISIT (OUTPATIENT)
Dept: SURGERY | Age: 63
End: 2023-03-09

## 2023-03-09 VITALS
WEIGHT: 160.6 LBS | HEART RATE: 81 BPM | BODY MASS INDEX: 29.55 KG/M2 | TEMPERATURE: 96.9 F | HEIGHT: 62 IN | SYSTOLIC BLOOD PRESSURE: 106 MMHG | DIASTOLIC BLOOD PRESSURE: 62 MMHG | OXYGEN SATURATION: 96 %

## 2023-03-09 DIAGNOSIS — R19.7 DIARRHEA, UNSPECIFIED TYPE: ICD-10-CM

## 2023-03-09 DIAGNOSIS — R53.83 OTHER FATIGUE: ICD-10-CM

## 2023-03-09 DIAGNOSIS — R35.0 INCREASED URINARY FREQUENCY: Primary | ICD-10-CM

## 2023-03-09 DIAGNOSIS — R10.12 LEFT UPPER QUADRANT ABDOMINAL PAIN: ICD-10-CM

## 2023-03-09 PROCEDURE — 99024 POSTOP FOLLOW-UP VISIT: CPT | Performed by: SURGERY

## 2023-03-09 RX ORDER — GABAPENTIN ENACARBIL 300 MG/1
300 TABLET, EXTENDED RELEASE ORAL 2 TIMES DAILY
COMMUNITY

## 2023-03-09 RX ORDER — CHOLESTYRAMINE 4 G/9G
1 POWDER, FOR SUSPENSION ORAL 2 TIMES DAILY
Qty: 90 PACKET | Refills: 3 | Status: SHIPPED | OUTPATIENT
Start: 2023-03-09

## 2023-03-09 NOTE — PROGRESS NOTES
Eulogio Chopra MD   General Surgery  New Patient Evaluation in Office  Pt Name: Alyssa Perkins  Date of Birth 1960   Today's Date: 3/9/2023  Medical Record Number: [id number]  Referring Provider: No ref. provider found  Primary Care Provider: Betsy Cooks, DO  Chief Complaint:  Chief Complaint   Patient presents with    Post-Op Check     S/p robotic assisted laparoscopic cholecystectomy with ICG green cholangiography 2. Extensive lysis of upper abdominal omental adhesions on 12/14/22. Pt having issues at surgery site/left side pain       ASSESSMENT      1. Increased urinary frequency    2. Diarrhea, unspecified type    3. Other fatigue    4. Left upper quadrant abdominal pain    5. Postcholecystectomy diarrhea     PLANS      1.  CT scan of the abdomen and pelvis. Patient complains of epigastric bloating. She states she can feel something sloshing around. 2.  Urinalysis and CBC  3. Questran prescribed for postcholecystectomy diarrhea        LEONIDES Osborne is a 65y.o. year old female who is presenting today in the office for follow-up evaluation after cholecystectomy. Almost 3 months ago she underwent a robotic assisted laparoscopic cholecystectomy and lysis of adhesions for symptomatic gallbladder disease. She has had some diarrhea postcholecystectomy but really complained of no pain until about 5 weeks ago. She had been in [place]. She describes left flank pain and has a history of kidney stones that then goes to the middle of the stomach area. She has a large midline incision from prior surgeries she had a Nissen fundoplication then a reversal and repeat open surgery at [facility]. At the time of her cholecystectomy there is no abdominal wall hernias identified. She had omental adhesions taken down and a cholecystectomy. Pathology of her gallbladder revealed chronic cholecystitis and gallstones. She denies any fever or chills. She denies any dark urine.   She denies any blood in the stool.  She wants to proceed with a CT scan.     Past Medical History  Past Medical History:   Diagnosis Date    Anxiety     Arthritis     Cancer ([address]     breast 2002 left    Difficult intubation     \"previously had vocal cord damage, used smaller tube (pedi) and has worked fine\"    Fracture of lumbar spine (HCC)     Hernia, hiatal     Nausea & vomiting     scopalomine patch works well    Osteoarthritis     PONV (postoperative nausea and vomiting)     Prolonged emergence from general anesthesia     slow to wake       Past Surgical History  Past Surgical History:   Procedure Laterality Date    [address]  12/2002    left lumpectomy-Dr. Marita Bower  09/06/2012    RT- [address] [place] [address] Bilateral 2019    [facility], LAPAROSCOPIC N/A 12/14/2022    ROBOTIC CHOLECYSTECTOMY performed by [facility] Castillo [facility]  01/17/2013    robotic assisted rt colectomy-Dr. Yanelis Warner    COLONOSCOPY  12/11/2012    Dr. Kiah Vieira  2003    Dr. Mirna Gonzalez, ESOPHAGUS  2013    colon resection    [facility]    x2    ENDOSCOPY, COLON, DIAGNOSTIC  12/11/2012    Dr. Shaylee Long Right 11/07/2013    1st metatarsal cuneiform fusion, Weil osteotomy 2nd-os staple     [address]   03/01/2016    robot hiatal hernia with fundoplication    HIATAL HERNIA REPAIR  03/2016    KNEE ARTHROSCOPY Right 06/2016    cortizone shots left knee    LAPAROSCOPY  2000    right oopherectomy-Dr. Fern Canchola    OTHER SURGICAL HISTORY  09/2003    Port removal-Dr. Ordaz    OVARY REMOVAL Right 2000    TONSILLECTOMY  1979    TUNNELED VENOUS PORT PLACEMENT  1/2003 ([place])    Port inserted in radiology    UPPER GASTROINTESTINAL ENDOSCOPY  02/15/2013    egd       Medications  Current Outpatient Medications Medication Sig Dispense Refill    Gabapentin Enacarbil ER (HORIZANT) 300 MG TBCR Take 300 mg by mouth in the morning and at bedtime. cholestyramine (QUESTRAN) 4 g packet Take 1 packet by mouth 2 times daily 90 packet 3    ondansetron (ZOFRAN) 4 MG tablet Take 1 tablet by mouth 3 times daily as needed for Nausea or Vomiting 15 tablet 0    valACYclovir (VALTREX) 500 MG tablet Take 500 mg by mouth daily      cyanocobalamin 1000 MCG tablet Take 1,000 mcg by mouth daily      furosemide (LASIX) 20 MG tablet Take 20 mg by mouth as needed Only takes when flying      citalopram (CELEXA) 20 MG tablet Take 30 mg by mouth daily      dicyclomine (BENTYL) 20 MG tablet Take 20 mg by mouth as needed      sucralfate (CARAFATE) 1 GM tablet Take 1 g by mouth 3 times daily      Biotin 08629 MCG TABS Take 20,000 mcg by mouth daily      ferrous fumarate-vitamin c (TERI-SEQUELS) 65-25 MG TBCR CR tablet Take 1 tablet by mouth daily (with breakfast)      omeprazole (PRILOSEC) 40 MG delayed release capsule Take 1 capsule by mouth every morning On empty stomach followed by a meal within 20 min 90 capsule 3     No current facility-administered medications for this visit.      Allergies     Allergies   Allergen Reactions    Vicodin [Hydrocodone-Acetaminophen] Nausea Only     Pupils dialated    Duricef [Cefadroxil] Nausea And Vomiting    Tape [Adhesive Tape] Rash       Family History  Family History   Problem Relation Age of Onset    Arthritis Mother     Heart Disease Mother     High Blood Pressure Father     Cancer Father         kidney  prostate       SocialHistory  Social History     Socioeconomic History    Marital status:      Spouse name: Not on file    Number of children: Not on file    Years of education: Not on file    Highest education level: Not on file   Occupational History     Employer: [employer]   Tobacco Use    Smoking status: Never    Smokeless tobacco: Never   Vaping Use    Vaping Use: Never used   Substance and Sexual Activity    Alcohol use: No    Drug use: No    Sexual activity: Not on file   Other Topics Concern    Not on file   Social History Narrative    Not on file     Social Determinants of Health     Financial Resource Strain: Not on file   Food Insecurity: Not on file   Transportation Needs: Not on file   Physical Activity: Not on file   Stress: Not on file   Social Connections: Not on file   Intimate Partner Violence: Not on file   Housing Stability: Not on file           Review of Systems  Review of Systems   Gastrointestinal:  Positive for abdominal pain and diarrhea. OBJECTIVE     /62 (Site: Right Upper Arm, Position: Sitting, Cuff Size: Medium Adult)   Pulse 81   Temp 96.9 °F (36.1 °C) (Temporal)   Ht 5' 2\" (1.575 m)   Wt 160 lb 9.6 oz (72.8 kg)   SpO2 96%   BMI 29.37 kg/m²      Physical Exam  Vitals reviewed. Constitutional:       Appearance: Normal appearance. She is well-developed. She is not ill-appearing or diaphoretic. HENT:      Head: Normocephalic and atraumatic. Nose: No congestion. Eyes:      General: No scleral icterus. Extraocular Movements: Extraocular movements intact. Neck:      Thyroid: No thyromegaly. Vascular: No JVD. Trachea: No tracheal deviation. Cardiovascular:      Rate and Rhythm: Normal rate and regular rhythm. Pulmonary:      Effort: No respiratory distress. Breath sounds: Normal breath sounds. No wheezing. Abdominal:      General: There is no distension. Palpations: Abdomen is soft. There is no mass. Tenderness: There is no abdominal tenderness. There is no guarding or rebound. Hernia: No hernia is present. Comments: Port sites well-healed no palpable hernia   Musculoskeletal:         General: No tenderness or deformity. Cervical back: Neck supple. No rigidity. Lymphadenopathy:      Cervical: No cervical adenopathy. Skin:     General: Skin is warm and dry. Coloration: Skin is not jaundiced or pale. Findings: No rash. Neurological:      General: No focal deficit present. Mental Status: She is alert and oriented to person, place, and time. Cranial Nerves: No cranial nerve deficit. Psychiatric:         Mood and Affect: Mood normal.         Behavior: Behavior normal.       Lab Results   Component Value Date    WBC 6.9 2022    HGB 13.6 2022    HCT 40.9 2022     2022    CHOL 188 2017    TRIG 59 2017    HDL 55 2017    ALT 11 2022    AST 24 2022     2022    K 3.7 2022     2022    CREATININE 0.6 2022    BUN 25 (H) 2022    CO2 23 2022    TSH 1.330 2017            Narrative  Performed by: [facility] Nilson Chaudhary. Pathology      [facility]                      [id number]   [address]                                                       PROC: 2022   [facility]                                    RECV: 2022   [address]                                    RPTD: 12/15/2022   Regino Hall [address]                       MRN:  [id number]    LOC: OR                       ACCT: [de-identified]  SEX: F                       : 1960  AGE: 65 Y                          PATHOLOGY REPORT                       ATTN: [facility]0 [facility] OLT                       [facility]       Copies To:   SHASHANK ROJAS       Clinical Information: CALCULUS OF GALLBLADDER WITH CHOLECYSTITIS   WITHOUT BILIARY OBSTRUCTION     FINAL DIAGNOSIS:   Gallbladder, cholecystectomy:    Chronic cholecystitis with cholelithiasis. Specimen:   GALLBLADDER       Gross Examination:   The container is labeled Leelavijay Florence gallbladder. Received in formalin   is a relatively intact gallbladder specimen with a smooth, pink serosa   and a palpable stone in the fundus.   The specimen measures 7.5 x 2.5 x   0.5 cm. The cystic duct has been clipped. The gallbladder is opened   longitudinally revealing a single dark green calculus measuring 1 cm in   greatest dimension. The wall measures 0.2 cm in greatest thickness. The mucosa is velvety orange. No masses or lesions are appreciated. Representative sections of the gallbladder wall are submitted in one   cassette. 1 cassette, ss. [id number]     Microscopic Examination:   Microscopic examination performed. [id number]                                                       <Sign Out Dr. Corrie Claire M.D., F.C. A. P       [facility]/ [id number] [address]  Printed on:  12/15/2022   Jenae Prasad 172   [facility] LAMINE  SUZANNE II.RACHAEL, [facility]   Original print date: 12/15/2022

## 2023-03-10 DIAGNOSIS — R35.0 INCREASED URINARY FREQUENCY: Primary | ICD-10-CM

## 2023-03-10 LAB
ABSOLUTE BASO #: 0.04 K/UL (ref 0–0.2)
ABSOLUTE EOS #: 0.18 K/UL (ref 0–0.5)
ABSOLUTE LYMPH #: 1.17 K/UL (ref 1–4)
ABSOLUTE MONO #: 0.61 K/UL (ref 0.2–1)
ABSOLUTE NEUT #: 2.76 K/UL (ref 1.5–7.5)
APPEARANCE: ABNORMAL
BACTERIA: ABNORMAL PER HPF
BACTERIA: ABNORMAL PER HPF
BASOPHILS RELATIVE PERCENT: 0.8 %
BILIRUBIN: NEGATIVE
COLOR: YELLOW
EOSINOPHILS RELATIVE PERCENT: 3.8 %
EPITHELIAL CELLS: ABNORMAL PER HPF (ref 0–10)
EPITHELIAL CELLS: ABNORMAL PER HPF (ref 0–10)
GLUCOSE BLD-MCNC: NEGATIVE MG/DL
HCT VFR BLD CALC: 37.2 % (ref 34–45)
HEMOGLOBIN: 12.6 G/DL (ref 11.5–15.5)
HYALINE CASTS: ABNORMAL
HYALINE CASTS: ABNORMAL
KETONES, URINE: NEGATIVE
LEUKOCYTE ESTERASE, URINE: ABNORMAL
LYMPHOCYTE %: 24.5 %
MCH RBC QN AUTO: 32.4 PG (ref 25–33)
MCHC RBC AUTO-ENTMCNC: 33.9 G/DL (ref 31–36)
MCV RBC AUTO: 95.6 FL (ref 80–99)
MONOCYTES # BLD: 12.8 %
NEUTROPHILS RELATIVE PERCENT: 57.9 %
NITRITE, URINE: NEGATIVE
OCCULT BLOOD,URINE: NEGATIVE
PDW BLD-RTO: 13.6 % (ref 11.5–15)
PH: 6 (ref 5–9)
PLATELETS: 284 K/UL (ref 130–400)
PMV BLD AUTO: 10.2 FL (ref 9.3–13)
PROTEIN, URINE: ABNORMAL
RBC: 3.89 M/UL (ref 3.8–5.4)
RBC: ABNORMAL PER HPF (ref 0–5)
RBC: ABNORMAL PER HPF (ref 0–5)
SP GRAVITY MISCELLANEOUS: 1.02 (ref 1–1.03)
UROBILINOGEN, URINE: 0.2
WBC: 4.8 K/UL (ref 3.5–11)
WBC: ABNORMAL PER HPF (ref 0–5)
WBC: ABNORMAL PER HPF (ref 0–5)

## 2023-03-10 RX ORDER — SULFAMETHOXAZOLE AND TRIMETHOPRIM 800; 160 MG/1; MG/1
1 TABLET ORAL 2 TIMES DAILY
Qty: 14 TABLET | Refills: 0 | Status: SHIPPED | OUTPATIENT
Start: 2023-03-10 | End: 2023-03-17

## 2023-03-10 ASSESSMENT — ENCOUNTER SYMPTOMS
DIARRHEA: 1
ABDOMINAL PAIN: 1

## 2023-03-10 NOTE — PROGRESS NOTES
Urinalysis had some white cells nitrite and leukocyte Estrace. Placed patient on Bactrim.   Needs follow-up appointment scheduled after CT imaging

## 2023-03-15 ENCOUNTER — HOSPITAL ENCOUNTER (OUTPATIENT)
Dept: CT IMAGING | Age: 63
Discharge: HOME | End: 2023-03-15
Payer: COMMERCIAL

## 2023-03-15 DIAGNOSIS — R19.7 DIARRHEA, UNSPECIFIED TYPE: ICD-10-CM

## 2023-03-15 DIAGNOSIS — R35.0 INCREASED URINARY FREQUENCY: ICD-10-CM

## 2023-03-15 DIAGNOSIS — R53.83 OTHER FATIGUE: ICD-10-CM

## 2023-03-15 DIAGNOSIS — R10.12 LEFT UPPER QUADRANT ABDOMINAL PAIN: ICD-10-CM

## 2023-03-15 LAB
CREAT BLD-MCNC: 0.8 MG/DL (ref 0.5–1.2)
GFR SERPL CREATININE-BSD FRML MDRD: > 60 ML/MIN/1.73M2

## 2023-03-15 PROCEDURE — 82565 ASSAY OF CREATININE: CPT

## 2023-03-15 PROCEDURE — 74178 CT ABD&PLV WO CNTR FLWD CNTR: CPT

## 2023-03-15 PROCEDURE — 6360000004 HC RX CONTRAST MEDICATION: Performed by: SURGERY

## 2023-03-15 RX ADMIN — IOPAMIDOL 85 ML: 755 INJECTION, SOLUTION INTRAVENOUS at 14:34

## 2023-03-16 ENCOUNTER — TELEPHONE (OUTPATIENT)
Dept: SURGERY | Age: 63
End: 2023-03-16

## 2023-03-16 NOTE — TELEPHONE ENCOUNTER
Left voicemail for patient to call  to reschedule appt with Dr. Hoover Caller on 3/23 as she will be out of the office that day

## 2023-03-27 ENCOUNTER — OFFICE VISIT (OUTPATIENT)
Dept: SURGERY | Age: 63
End: 2023-03-27
Payer: COMMERCIAL

## 2023-03-27 VITALS
TEMPERATURE: 97.6 F | SYSTOLIC BLOOD PRESSURE: 122 MMHG | RESPIRATION RATE: 16 BRPM | HEART RATE: 89 BPM | DIASTOLIC BLOOD PRESSURE: 62 MMHG | OXYGEN SATURATION: 95 % | HEIGHT: 62 IN | BODY MASS INDEX: 29.26 KG/M2 | WEIGHT: 159 LBS

## 2023-03-27 DIAGNOSIS — R19.7 DIARRHEA, UNSPECIFIED TYPE: ICD-10-CM

## 2023-03-27 DIAGNOSIS — K80.20 CALCULUS OF GALLBLADDER WITHOUT CHOLECYSTITIS WITHOUT OBSTRUCTION: Primary | ICD-10-CM

## 2023-03-27 DIAGNOSIS — N20.0 KIDNEY STONE: ICD-10-CM

## 2023-03-27 PROCEDURE — 99213 OFFICE O/P EST LOW 20 MIN: CPT | Performed by: SURGERY

## 2023-03-27 ASSESSMENT — ENCOUNTER SYMPTOMS
DIARRHEA: 1
ABDOMINAL PAIN: 0

## 2023-03-27 NOTE — PROGRESS NOTES
report has been created using voice recognition software. It may contain minor errors which are inherent in voice recognition technology. **       Final report electronically signed by Dr. Brianna Castaneda MD on 3/15/2023 4:47 PM

## 2023-03-28 ASSESSMENT — ENCOUNTER SYMPTOMS
APNEA: 0
SORE THROAT: 0

## 2023-05-24 ENCOUNTER — HOSPITAL ENCOUNTER (OUTPATIENT)
Dept: GENERAL RADIOLOGY | Age: 63
Discharge: HOME | End: 2023-05-24
Payer: COMMERCIAL

## 2023-05-24 ENCOUNTER — HOSPITAL ENCOUNTER (OUTPATIENT)
Age: 63
Discharge: HOME | End: 2023-05-24
Payer: COMMERCIAL

## 2023-05-24 DIAGNOSIS — M15.9 PRIMARY OSTEOARTHRITIS INVOLVING MULTIPLE JOINTS: ICD-10-CM

## 2023-05-24 PROCEDURE — 73110 X-RAY EXAM OF WRIST: CPT

## 2023-05-24 PROCEDURE — 73130 X-RAY EXAM OF HAND: CPT

## 2023-08-28 ENCOUNTER — OFFICE VISIT (OUTPATIENT)
Dept: FAMILY MEDICINE CLINIC | Age: 63
End: 2023-08-28
Payer: COMMERCIAL

## 2023-08-28 VITALS
HEART RATE: 74 BPM | SYSTOLIC BLOOD PRESSURE: 124 MMHG | OXYGEN SATURATION: 96 % | TEMPERATURE: 97.9 F | HEIGHT: 62 IN | BODY MASS INDEX: 29.22 KG/M2 | RESPIRATION RATE: 16 BRPM | WEIGHT: 158.8 LBS | DIASTOLIC BLOOD PRESSURE: 82 MMHG

## 2023-08-28 DIAGNOSIS — K21.9 GASTROESOPHAGEAL REFLUX DISEASE WITHOUT ESOPHAGITIS: ICD-10-CM

## 2023-08-28 DIAGNOSIS — Z13.220 SCREENING FOR LIPID DISORDERS: ICD-10-CM

## 2023-08-28 DIAGNOSIS — J01.00 ACUTE NON-RECURRENT MAXILLARY SINUSITIS: Primary | ICD-10-CM

## 2023-08-28 DIAGNOSIS — M79.7 FIBROMYALGIA: ICD-10-CM

## 2023-08-28 DIAGNOSIS — Z11.59 NEED FOR HEPATITIS C SCREENING TEST: ICD-10-CM

## 2023-08-28 DIAGNOSIS — G62.9 NEUROPATHY: ICD-10-CM

## 2023-08-28 PROBLEM — M19.042 PRIMARY OSTEOARTHRITIS, LEFT HAND: Status: ACTIVE | Noted: 2023-08-28

## 2023-08-28 PROCEDURE — 99213 OFFICE O/P EST LOW 20 MIN: CPT | Performed by: FAMILY MEDICINE

## 2023-08-28 RX ORDER — SOD SULF/POT CHLORIDE/MAG SULF 1.479 G
TABLET ORAL
COMMUNITY
End: 2023-08-28

## 2023-08-28 RX ORDER — NAPROXEN 500 MG/1
TABLET ORAL
COMMUNITY
End: 2023-08-28

## 2023-08-28 RX ORDER — TIZANIDINE 4 MG/1
4 TABLET ORAL 2 TIMES DAILY PRN
COMMUNITY
Start: 2023-07-12

## 2023-08-28 RX ORDER — GABAPENTIN 300 MG/1
CAPSULE ORAL
COMMUNITY
Start: 2023-08-19

## 2023-08-28 RX ORDER — CYANOCOBALAMIN 1000 UG/ML
INJECTION, SOLUTION INTRAMUSCULAR; SUBCUTANEOUS
COMMUNITY
Start: 2023-08-06

## 2023-08-28 RX ORDER — TRIAMCINOLONE ACETONIDE 1 MG/G
CREAM TOPICAL
COMMUNITY

## 2023-08-28 RX ORDER — MODAFINIL 200 MG/1
TABLET ORAL
COMMUNITY
End: 2023-08-28

## 2023-08-28 RX ORDER — AZITHROMYCIN 250 MG/1
TABLET, FILM COATED ORAL
Qty: 6 TABLET | Refills: 0 | Status: SHIPPED | OUTPATIENT
Start: 2023-08-28 | End: 2023-09-02

## 2023-08-28 RX ORDER — SYRINGE-NEEDLE,INSULIN,0.5 ML 28GX1/2"
SYRINGE, EMPTY DISPOSABLE MISCELLANEOUS
COMMUNITY
End: 2023-08-28

## 2023-08-28 RX ORDER — METFORMIN HYDROCHLORIDE 500 MG/1
500 TABLET, EXTENDED RELEASE ORAL DAILY
COMMUNITY
Start: 2023-08-06

## 2023-08-28 SDOH — ECONOMIC STABILITY: INCOME INSECURITY: HOW HARD IS IT FOR YOU TO PAY FOR THE VERY BASICS LIKE FOOD, HOUSING, MEDICAL CARE, AND HEATING?: NOT HARD AT ALL

## 2023-08-28 SDOH — ECONOMIC STABILITY: FOOD INSECURITY: WITHIN THE PAST 12 MONTHS, THE FOOD YOU BOUGHT JUST DIDN'T LAST AND YOU DIDN'T HAVE MONEY TO GET MORE.: NEVER TRUE

## 2023-08-28 SDOH — ECONOMIC STABILITY: HOUSING INSECURITY
IN THE LAST 12 MONTHS, WAS THERE A TIME WHEN YOU DID NOT HAVE A STEADY PLACE TO SLEEP OR SLEPT IN A SHELTER (INCLUDING NOW)?: NO

## 2023-08-28 SDOH — ECONOMIC STABILITY: FOOD INSECURITY: WITHIN THE PAST 12 MONTHS, YOU WORRIED THAT YOUR FOOD WOULD RUN OUT BEFORE YOU GOT MONEY TO BUY MORE.: NEVER TRUE

## 2023-08-28 ASSESSMENT — ENCOUNTER SYMPTOMS
RHINORRHEA: 1
SINUS PRESSURE: 1
COUGH: 1
VOMITING: 0
TROUBLE SWALLOWING: 0
WHEEZING: 0
CHEST TIGHTNESS: 0
SHORTNESS OF BREATH: 0
SORE THROAT: 1
NAUSEA: 0
CONSTIPATION: 0
SINUS PAIN: 1
DIARRHEA: 0

## 2023-08-28 ASSESSMENT — PATIENT HEALTH QUESTIONNAIRE - PHQ9
SUM OF ALL RESPONSES TO PHQ QUESTIONS 1-9: 0
SUM OF ALL RESPONSES TO PHQ QUESTIONS 1-9: 0
2. FEELING DOWN, DEPRESSED OR HOPELESS: 0
SUM OF ALL RESPONSES TO PHQ QUESTIONS 1-9: 0
SUM OF ALL RESPONSES TO PHQ QUESTIONS 1-9: 0
SUM OF ALL RESPONSES TO PHQ9 QUESTIONS 1 & 2: 0
1. LITTLE INTEREST OR PLEASURE IN DOING THINGS: 0

## 2023-08-28 NOTE — PROGRESS NOTES
Vannessa Del Rio (:  1960) is a 59 y.o. female,Established patient, here for evaluation of the following chief complaint(s):  New Patient (Establish care), Skin Problem (Check spider bites), Sinus Problem (Ongoing since 8/10/23), and Ear Fullness      Subjective   SUBJECTIVE/OBJECTIVE:  HPI  Patient presents with cold symptoms for 2 weeks  Known exposures doing some yardwork with lavendar  Treatment Flonase, Zyrtec, Sudafed, prednisone  Better congestion and pressure are better  Worse last few days, yellow drainage   Rarely using furosemide unless she is flying    Review of Systems   Constitutional:  Positive for fatigue. Negative for activity change, chills and fever. HENT:  Positive for congestion, ear pain, postnasal drip, rhinorrhea, sinus pressure, sinus pain, sneezing and sore throat. Negative for trouble swallowing. Respiratory:  Positive for cough (clearing her throat). Negative for chest tightness, shortness of breath and wheezing. Cardiovascular:  Positive for leg swelling (yesterday after flight back from [place], resolved with Lasix). Negative for chest pain. Gastrointestinal:  Negative for constipation, diarrhea, nausea and vomiting. Musculoskeletal:  Negative for arthralgias. Skin:  Positive for rash (several healing lesions on her back (spider bites per patient)). Hematological:  Negative for adenopathy. Objective   Physical Exam  Constitutional:       General: She is not in acute distress. Appearance: Normal appearance. She is normal weight. She is not ill-appearing. HENT:      Head: Normocephalic and atraumatic. Nose: Mucosal edema, congestion and rhinorrhea present. No nasal tenderness. Rhinorrhea is purulent. Right Turbinates: Swollen. Left Turbinates: Swollen. Right Sinus: Maxillary sinus tenderness present. Left Sinus: Maxillary sinus tenderness present.       Mouth/Throat:      Pharynx: Posterior oropharyngeal erythema (posterior

## 2023-08-31 ENCOUNTER — PATIENT MESSAGE (OUTPATIENT)
Dept: FAMILY MEDICINE CLINIC | Age: 63
End: 2023-08-31

## 2023-08-31 DIAGNOSIS — B00.1 COLD SORE: Primary | ICD-10-CM

## 2023-08-31 DIAGNOSIS — F41.9 ANXIETY: ICD-10-CM

## 2023-08-31 RX ORDER — CITALOPRAM 40 MG/1
40 TABLET ORAL DAILY
Qty: 90 TABLET | Refills: 1 | Status: SHIPPED | OUTPATIENT
Start: 2023-08-31

## 2023-08-31 RX ORDER — VALACYCLOVIR HYDROCHLORIDE 500 MG/1
500 TABLET, FILM COATED ORAL DAILY
Qty: 90 TABLET | Refills: 1 | Status: SHIPPED | OUTPATIENT
Start: 2023-08-31

## 2023-08-31 NOTE — TELEPHONE ENCOUNTER
From: Isaura Lyon  To: Dr. Yohana Ibarra: 8/31/2023 10:00 AM EDT  Subject: Lynn Lillie - getting that tingling feeling again in the same place on my lip. Definitely a cold sore starting   Been off the medication for awhile. Maybe I need to start back on it. I really think it starts when I am really stressed out. With Justin's upcoming surgery the tension is really high at home.   Thank you  Jeet Herring

## 2023-09-20 ENCOUNTER — TELEPHONE (OUTPATIENT)
Dept: SURGERY | Age: 63
End: 2023-09-20

## 2023-09-20 NOTE — TELEPHONE ENCOUNTER
Left voicemail; patient needs to reschedule appt with Dr. Laura January on 9/28 due to her being out of the office that day

## 2023-09-26 ENCOUNTER — OFFICE VISIT (OUTPATIENT)
Dept: FAMILY MEDICINE CLINIC | Age: 63
End: 2023-09-26
Payer: COMMERCIAL

## 2023-09-26 VITALS
WEIGHT: 160.4 LBS | RESPIRATION RATE: 14 BRPM | OXYGEN SATURATION: 97 % | TEMPERATURE: 97.9 F | SYSTOLIC BLOOD PRESSURE: 134 MMHG | DIASTOLIC BLOOD PRESSURE: 82 MMHG | HEIGHT: 62 IN | HEART RATE: 78 BPM | BODY MASS INDEX: 29.52 KG/M2

## 2023-09-26 DIAGNOSIS — H65.03 NON-RECURRENT ACUTE SEROUS OTITIS MEDIA OF BOTH EARS: Primary | ICD-10-CM

## 2023-09-26 DIAGNOSIS — M54.2 CERVICALGIA: ICD-10-CM

## 2023-09-26 DIAGNOSIS — M99.08 SEGMENTAL AND SOMATIC DYSFUNCTION OF RIB CAGE: ICD-10-CM

## 2023-09-26 DIAGNOSIS — H81.12 BPPV (BENIGN PAROXYSMAL POSITIONAL VERTIGO), LEFT: ICD-10-CM

## 2023-09-26 DIAGNOSIS — M99.02 SEGMENTAL DYSFUNCTION OF THORACIC REGION: ICD-10-CM

## 2023-09-26 DIAGNOSIS — M99.01 CERVICAL (NECK) REGION SOMATIC DYSFUNCTION: ICD-10-CM

## 2023-09-26 PROCEDURE — 98926 OSTEOPATH MANJ 3-4 REGIONS: CPT | Performed by: FAMILY MEDICINE

## 2023-09-26 PROCEDURE — 99213 OFFICE O/P EST LOW 20 MIN: CPT | Performed by: FAMILY MEDICINE

## 2023-09-26 RX ORDER — OFLOXACIN 3 MG/ML
5 SOLUTION AURICULAR (OTIC) 2 TIMES DAILY
Qty: 10 ML | Refills: 1 | Status: SHIPPED | OUTPATIENT
Start: 2023-09-26 | End: 2023-11-05

## 2023-09-26 ASSESSMENT — ENCOUNTER SYMPTOMS
TROUBLE SWALLOWING: 0
NAUSEA: 0
VOMITING: 0
CHEST TIGHTNESS: 0
SHORTNESS OF BREATH: 0
WHEEZING: 0
RHINORRHEA: 1
SINUS PAIN: 0
DIARRHEA: 0
SORE THROAT: 0
COUGH: 0
CONSTIPATION: 0
SINUS PRESSURE: 0

## 2023-09-26 NOTE — PROGRESS NOTES
Verito Quintanilla (:  1960) is a 59 y.o. female,Established patient, here for evaluation of the following chief complaint(s):  Neck Pain (omt), Otalgia, and Ear Fullness      Subjective   SUBJECTIVE/OBJECTIVE:  HPI  Ear symptoms have continued to persist, her sinus symptoms have resolved  States she gets pain at times in ears---worse on the right  Has also been having more L sided neck pain and upper back pain---triggering some migraines  New granddaughter born yesterday, flying to [place] at the end of October. Review of Systems   Constitutional:  Positive for fatigue. Negative for activity change, chills and fever. HENT:  Positive for ear pain (pressure) and rhinorrhea. Negative for congestion, postnasal drip, sinus pressure, sinus pain, sneezing, sore throat and trouble swallowing. Respiratory:  Negative for cough, chest tightness, shortness of breath and wheezing. Cardiovascular:  Negative for chest pain, palpitations and leg swelling. Gastrointestinal:  Negative for constipation, diarrhea, nausea and vomiting. Musculoskeletal:  Positive for neck pain and neck stiffness (to the left). Negative for arthralgias. Skin:  Negative for rash. Neurological:  Positive for dizziness (with motion) and headaches. Negative for light-headedness. Hematological:  Negative for adenopathy. Objective   Physical Exam  Constitutional:       General: She is not in acute distress. Appearance: Normal appearance. She is normal weight. She is not ill-appearing. HENT:      Head: Normocephalic and atraumatic. Right Ear: A middle ear effusion is present. Left Ear: A middle ear effusion is present. Nose: Rhinorrhea (clear) present. No mucosal edema or congestion. Rhinorrhea is purulent. Right Turbinates: Swollen and pale. Left Turbinates: Swollen (minimally) and pale. Right Sinus: No maxillary sinus tenderness or frontal sinus tenderness.       Left Sinus: No maxillary sinus

## 2023-10-02 ENCOUNTER — OFFICE VISIT (OUTPATIENT)
Dept: SURGERY | Age: 63
End: 2023-10-02
Payer: COMMERCIAL

## 2023-10-02 VITALS
DIASTOLIC BLOOD PRESSURE: 62 MMHG | HEART RATE: 84 BPM | OXYGEN SATURATION: 96 % | BODY MASS INDEX: 29.59 KG/M2 | TEMPERATURE: 97.5 F | HEIGHT: 62 IN | WEIGHT: 160.8 LBS | SYSTOLIC BLOOD PRESSURE: 118 MMHG | RESPIRATION RATE: 16 BRPM

## 2023-10-02 DIAGNOSIS — Z90.49 S/P PARTIAL RESECTION OF COLON: ICD-10-CM

## 2023-10-02 DIAGNOSIS — R19.7 DIARRHEA, UNSPECIFIED TYPE: Primary | ICD-10-CM

## 2023-10-02 DIAGNOSIS — N20.0 KIDNEY STONE: ICD-10-CM

## 2023-10-02 PROCEDURE — 99213 OFFICE O/P EST LOW 20 MIN: CPT | Performed by: SURGERY

## 2023-10-02 ASSESSMENT — ENCOUNTER SYMPTOMS
SORE THROAT: 0
APNEA: 0
DIARRHEA: 1

## 2023-10-02 NOTE — PROGRESS NOTES
Aydin Mack MD   Follow up  Patient Evaluation in Office  Pt Name: Rodrigo Childs  Date of Birth 1960   Today's Date: 10/2/2023  Medical Record Number: [id number]  Referring Provider: No ref. provider found  Primary Care Provider: Caren Cervantes DO  Chief Complaint:  Chief Complaint   Patient presents with    6 Month Follow-Up     S/p robotic assisted laparoscopic cholecystectomy with ICG green cholangiography 2. Extensive lysis of upper abdominal omental adhesions on 12/14/22-Last seen 3/27/23       ASSESSMENT      1. Diarrhea, unspecified type    2. Kidney stone    3. S/P partial resection of colon    4. Postcholecystectomy diarrhea in addition to prior right colon resection. 5.  History of renal stones without current symptoms   PLANS      1. Raudel Su still has some diarrhea. She is also had a right colectomy. She states Questran really does not help. She will take Imodium intermittently. 2.  Surgical incisions are all well-healed. 3.  No evidence of abdominal wall hernia on exam.  Offered follow-up CT scan if patient desires. She declines at this point. SUBJECTIVE   History of present illness:  Raudel Su is a 59y.o. year old female who is presenting today in the office for follow-up evaluation after cholecystectomy. Almost 3 months ago she underwent a robotic assisted laparoscopic cholecystectomy and lysis of adhesions for symptomatic gallbladder disease. She has had some diarrhea postcholecystectomy but really complained of no pain until about 5 weeks ago. She had been in [place]. She describes left flank pain and has a history of kidney stones that then goes to the middle of the stomach area. She has a large midline incision from prior surgeries she had a Nissen fundoplication then a reversal and [facility]. At the time of her cholecystectomy there is no abdominal wall hernias identified. She had omental adhesions taken down and a cholecystectomy.   Pathology of her

## 2023-10-04 ASSESSMENT — ENCOUNTER SYMPTOMS: ABDOMINAL PAIN: 1

## 2023-10-09 ENCOUNTER — OFFICE VISIT (OUTPATIENT)
Dept: FAMILY MEDICINE CLINIC | Age: 63
End: 2023-10-09
Payer: COMMERCIAL

## 2023-10-09 VITALS
RESPIRATION RATE: 16 BRPM | DIASTOLIC BLOOD PRESSURE: 74 MMHG | WEIGHT: 160 LBS | OXYGEN SATURATION: 96 % | TEMPERATURE: 97.5 F | HEART RATE: 81 BPM | HEIGHT: 62 IN | BODY MASS INDEX: 29.44 KG/M2 | SYSTOLIC BLOOD PRESSURE: 126 MMHG

## 2023-10-09 DIAGNOSIS — M99.02 SEGMENTAL DYSFUNCTION OF THORACIC REGION: ICD-10-CM

## 2023-10-09 DIAGNOSIS — M54.2 CERVICALGIA: Primary | ICD-10-CM

## 2023-10-09 DIAGNOSIS — M99.08 SEGMENTAL AND SOMATIC DYSFUNCTION OF RIB CAGE: ICD-10-CM

## 2023-10-09 DIAGNOSIS — H81.12 BPPV (BENIGN PAROXYSMAL POSITIONAL VERTIGO), LEFT: ICD-10-CM

## 2023-10-09 DIAGNOSIS — M99.01 CERVICAL (NECK) REGION SOMATIC DYSFUNCTION: ICD-10-CM

## 2023-10-09 PROCEDURE — 99213 OFFICE O/P EST LOW 20 MIN: CPT | Performed by: FAMILY MEDICINE

## 2023-10-09 PROCEDURE — 98926 OSTEOPATH MANJ 3-4 REGIONS: CPT | Performed by: FAMILY MEDICINE

## 2023-10-09 ASSESSMENT — ENCOUNTER SYMPTOMS
CONSTIPATION: 0
SORE THROAT: 0
NAUSEA: 1
RHINORRHEA: 0
SINUS PRESSURE: 0
VOMITING: 0
ABDOMINAL PAIN: 0
COUGH: 0
SHORTNESS OF BREATH: 0
WHEEZING: 0
SINUS PAIN: 0
DIARRHEA: 0

## 2023-10-09 NOTE — PROGRESS NOTES
Maci Casas (:  1960) is a 59 y.o. female,Established patient, here for evaluation of the following chief complaint(s):  Neck Pain (omt)      Subjective   SUBJECTIVE/OBJECTIVE:  Neck Pain   Associated symptoms include headaches. Pertinent negatives include no chest pain or fever. Pain located L side of neck into upper back  Pain started to flare back up this weekend  Has caused her vertigo to worsen as well, did much better after recent OMT  Treatment:  OMT, antibiotics, steroids  Worsens: when upper back tightens  Improves: OMT helped more than other treatment  [facility]     Review of Systems   Constitutional:  Positive for fatigue. Negative for activity change, chills, fever and unexpected weight change. HENT:  Negative for congestion, rhinorrhea, sinus pressure, sinus pain and sore throat. Respiratory:  Negative for cough, shortness of breath and wheezing. Cardiovascular:  Negative for chest pain, palpitations and leg swelling. Gastrointestinal:  Positive for nausea (mild). Negative for abdominal pain, constipation, diarrhea and vomiting. Musculoskeletal:  Positive for neck pain and neck stiffness. Neurological:  Positive for dizziness (motion flares it up) and headaches. Negative for light-headedness. Objective   Physical Exam  Constitutional:       General: She is not in acute distress. Appearance: Normal appearance. She is obese. She is not ill-appearing. HENT:      Head: Normocephalic and atraumatic. Nose: Mucosal edema, congestion (boggy) and rhinorrhea (clear) present. Right Turbinates: Swollen. Left Turbinates: Swollen. Mouth/Throat:      Pharynx: Posterior oropharyngeal erythema (posterior cobblestoning) present. No oropharyngeal exudate. Eyes:      Extraocular Movements: Extraocular movements intact. Pupils: Pupils are equal, round, and reactive to light.    Cardiovascular:      Rate and Rhythm: Normal rate and

## 2023-10-16 ENCOUNTER — OFFICE VISIT (OUTPATIENT)
Dept: FAMILY MEDICINE CLINIC | Age: 63
End: 2023-10-16
Payer: COMMERCIAL

## 2023-10-16 VITALS
WEIGHT: 162.8 LBS | HEIGHT: 62 IN | RESPIRATION RATE: 16 BRPM | BODY MASS INDEX: 29.96 KG/M2 | OXYGEN SATURATION: 96 % | HEART RATE: 76 BPM | TEMPERATURE: 97.4 F | DIASTOLIC BLOOD PRESSURE: 78 MMHG | SYSTOLIC BLOOD PRESSURE: 118 MMHG

## 2023-10-16 DIAGNOSIS — M26.629 TMJ SYNDROME: ICD-10-CM

## 2023-10-16 DIAGNOSIS — H65.02 NON-RECURRENT ACUTE SEROUS OTITIS MEDIA OF LEFT EAR: Primary | ICD-10-CM

## 2023-10-16 PROCEDURE — 99213 OFFICE O/P EST LOW 20 MIN: CPT | Performed by: FAMILY MEDICINE

## 2023-10-16 RX ORDER — OXYBUTYNIN CHLORIDE 5 MG/1
5 TABLET, EXTENDED RELEASE ORAL DAILY
COMMUNITY
Start: 2023-10-14

## 2023-10-16 RX ORDER — FUROSEMIDE 20 MG/1
20 TABLET ORAL AS NEEDED
Qty: 30 TABLET | Refills: 0 | Status: SHIPPED | OUTPATIENT
Start: 2023-10-16

## 2023-10-16 RX ORDER — AMOXICILLIN AND CLAVULANATE POTASSIUM 875; 125 MG/1; MG/1
1 TABLET, FILM COATED ORAL 2 TIMES DAILY
Qty: 20 TABLET | Refills: 0 | Status: SHIPPED | OUTPATIENT
Start: 2023-10-16 | End: 2023-10-26

## 2023-10-16 RX ORDER — PREDNISONE 20 MG/1
20 TABLET ORAL 2 TIMES DAILY
Qty: 10 TABLET | Refills: 0 | Status: SHIPPED | OUTPATIENT
Start: 2023-10-16 | End: 2023-10-21

## 2023-10-16 ASSESSMENT — ENCOUNTER SYMPTOMS
SORE THROAT: 1
SINUS PRESSURE: 1
SHORTNESS OF BREATH: 0
BACK PAIN: 1
CONSTIPATION: 0
RHINORRHEA: 1
TROUBLE SWALLOWING: 0
DIARRHEA: 0
CHEST TIGHTNESS: 0
WHEEZING: 0
SINUS PAIN: 0
NAUSEA: 0
VOMITING: 0
COUGH: 0

## 2023-10-16 NOTE — PROGRESS NOTES
Vonda Soares (:  1960) is a 59 y.o. female,Established patient, here for evaluation of the following chief complaint(s):  Follow-up, Neck Pain (omt), Nasal Congestion, and Headache      Subjective   SUBJECTIVE/OBJECTIVE:  HPI  Patient presents with L jaw and ear pain for 3 days, also with some neck pain  Known exposures recent problems with vertigo, warmth and swelling of L jawline  Treatment nothing, couldn't take a break yesterday from cleaning and mowing yard, THC ointment and ibuprofen  Better today  Worse yesterday, when opening her mouth     Review of Systems   Constitutional:  Positive for fatigue. Negative for activity change, appetite change, chills, fever and unexpected weight change. HENT:  Positive for congestion, ear pain (left side only), postnasal drip, rhinorrhea, sinus pressure (on L side) and sore throat (scratchy). Negative for sinus pain, sneezing and trouble swallowing. Respiratory:  Negative for cough, chest tightness, shortness of breath and wheezing. Cardiovascular:  Negative for chest pain and leg swelling. Gastrointestinal:  Negative for constipation, diarrhea, nausea and vomiting. Genitourinary:         No bowel or bladder changes   Musculoskeletal:  Positive for back pain, neck pain and neck stiffness. Negative for arthralgias and myalgias. Skin:  Negative for rash. Neurological:  Positive for dizziness (ongoing), light-headedness and headaches. Hematological:  Negative for adenopathy. Objective   Physical Exam  Constitutional:       General: She is not in acute distress. Appearance: Normal appearance. She is obese. She is not ill-appearing. HENT:      Head: Normocephalic and atraumatic. Nose: Mucosal edema, congestion and rhinorrhea present. Rhinorrhea is purulent. Right Turbinates: Swollen. Left Turbinates: Swollen. Mouth/Throat:      Pharynx: Posterior oropharyngeal erythema (posterior cobblestoning) present.  No oropharyngeal

## 2023-10-20 DIAGNOSIS — Z90.49 STATUS POST LAPAROSCOPIC CHOLECYSTECTOMY: ICD-10-CM

## 2023-10-20 DIAGNOSIS — R10.12 LEFT UPPER QUADRANT ABDOMINAL PAIN: ICD-10-CM

## 2023-10-20 DIAGNOSIS — R19.7 DIARRHEA, UNSPECIFIED TYPE: Primary | ICD-10-CM

## 2023-10-20 DIAGNOSIS — Z90.49 S/P PARTIAL RESECTION OF COLON: ICD-10-CM

## 2023-10-23 ENCOUNTER — OFFICE VISIT (OUTPATIENT)
Dept: FAMILY MEDICINE CLINIC | Age: 63
End: 2023-10-23
Payer: COMMERCIAL

## 2023-10-23 VITALS
SYSTOLIC BLOOD PRESSURE: 118 MMHG | DIASTOLIC BLOOD PRESSURE: 72 MMHG | TEMPERATURE: 97.5 F | HEIGHT: 62 IN | RESPIRATION RATE: 16 BRPM | HEART RATE: 76 BPM | BODY MASS INDEX: 29.85 KG/M2 | OXYGEN SATURATION: 96 % | WEIGHT: 162.2 LBS

## 2023-10-23 DIAGNOSIS — M54.2 CERVICALGIA: ICD-10-CM

## 2023-10-23 DIAGNOSIS — M26.629 TMJ SYNDROME: Primary | ICD-10-CM

## 2023-10-23 DIAGNOSIS — M99.08 SEGMENTAL AND SOMATIC DYSFUNCTION OF RIB CAGE: ICD-10-CM

## 2023-10-23 DIAGNOSIS — M99.01 CERVICAL (NECK) REGION SOMATIC DYSFUNCTION: ICD-10-CM

## 2023-10-23 DIAGNOSIS — M99.02 SEGMENTAL DYSFUNCTION OF THORACIC REGION: ICD-10-CM

## 2023-10-23 PROBLEM — M17.12 ARTHRITIS OF KNEE, LEFT: Status: ACTIVE | Noted: 2023-10-23

## 2023-10-23 PROCEDURE — 98926 OSTEOPATH MANJ 3-4 REGIONS: CPT | Performed by: FAMILY MEDICINE

## 2023-10-23 PROCEDURE — 99212 OFFICE O/P EST SF 10 MIN: CPT | Performed by: FAMILY MEDICINE

## 2023-10-23 RX ORDER — MELOXICAM 15 MG/1
TABLET ORAL
COMMUNITY
Start: 2023-10-23

## 2023-10-23 RX ORDER — ASPIRIN 81 MG/1
81 TABLET, CHEWABLE ORAL DAILY
COMMUNITY

## 2023-10-23 ASSESSMENT — ENCOUNTER SYMPTOMS
SINUS PAIN: 0
NAUSEA: 0
SINUS PRESSURE: 0
DIARRHEA: 0
RHINORRHEA: 1
CHEST TIGHTNESS: 0
BACK PAIN: 1
CONSTIPATION: 0
SORE THROAT: 0
SHORTNESS OF BREATH: 0
VOMITING: 0
TROUBLE SWALLOWING: 0

## 2023-10-23 NOTE — PROGRESS NOTES
Jay Urena (:  1960) is a 59 y.o. female,Established patient, here for evaluation of the following chief complaint(s):  Follow-up (Patient states that she got a steroid injection in her left knee today at [facility].)      Subjective   SUBJECTIVE/OBJECTIVE:  HPI  Pain located L knee (just received steroid injection from [facility]), neck and upper back pain, some crackling in her ears yet, L side of jaw and neck worse  Pain started at the time of initial allergy/sinus symptoms while in [place] during the summer  Treatment:  antibiotics, steroids, OMT, meclizine  Worsens: when neck is stiff  Improves: meds have helped but never resolved completely     Review of Systems   Constitutional:  Positive for fatigue. Negative for activity change, appetite change, chills, fever and unexpected weight change. HENT:  Positive for congestion, postnasal drip and rhinorrhea. Negative for ear pain (crackling in her ears, worse on the left), sinus pressure, sinus pain, sneezing, sore throat and trouble swallowing. Respiratory:  Negative for chest tightness and shortness of breath. Cardiovascular:  Negative for chest pain and leg swelling. Gastrointestinal:  Negative for constipation, diarrhea, nausea and vomiting. Genitourinary:         No bowel or bladder changes   Musculoskeletal:  Positive for back pain, neck pain and neck stiffness. Negative for arthralgias and myalgias. Skin:  Negative for rash. Neurological:  Positive for headaches. Negative for dizziness and light-headedness (no vertigo at this time). Hematological:  Negative for adenopathy. Objective   Physical Exam  Constitutional:       Appearance: Normal appearance. HENT:      Head: Normocephalic and atraumatic. Right Ear: A middle ear effusion is present. Left Ear: A middle ear effusion is present. Nose:      Right Turbinates: Pale. Left Turbinates: Pale.       Right Sinus: No maxillary sinus tenderness or frontal sinus

## 2023-10-24 ENCOUNTER — TELEPHONE (OUTPATIENT)
Dept: SURGERY | Age: 63
End: 2023-10-24

## 2023-11-06 RX ORDER — PREDNISONE 20 MG/1
20 TABLET ORAL 2 TIMES DAILY
Qty: 10 TABLET | Refills: 0 | Status: SHIPPED | OUTPATIENT
Start: 2023-11-06 | End: 2023-11-11

## 2023-11-06 RX ORDER — MECLIZINE HYDROCHLORIDE 25 MG/1
25 TABLET ORAL 3 TIMES DAILY PRN
Qty: 30 TABLET | Refills: 0 | Status: SHIPPED | OUTPATIENT
Start: 2023-11-06 | End: 2023-11-16

## 2023-11-07 ENCOUNTER — HOSPITAL ENCOUNTER (OUTPATIENT)
Dept: CT IMAGING | Age: 63
Discharge: HOME | End: 2023-11-07
Attending: SURGERY
Payer: COMMERCIAL

## 2023-11-07 DIAGNOSIS — R10.12 LEFT UPPER QUADRANT ABDOMINAL PAIN: ICD-10-CM

## 2023-11-07 DIAGNOSIS — R19.7 DIARRHEA, UNSPECIFIED TYPE: ICD-10-CM

## 2023-11-07 DIAGNOSIS — Z90.49 STATUS POST LAPAROSCOPIC CHOLECYSTECTOMY: ICD-10-CM

## 2023-11-07 DIAGNOSIS — Z90.49 S/P PARTIAL RESECTION OF COLON: ICD-10-CM

## 2023-11-07 LAB
CREAT BLD-MCNC: 0.6 MG/DL (ref 0.5–1.2)
GFR SERPL CREATININE-BSD FRML MDRD: > 60 ML/MIN/1.73M2

## 2023-11-07 PROCEDURE — 82565 ASSAY OF CREATININE: CPT

## 2023-11-07 PROCEDURE — 74177 CT ABD & PELVIS W/CONTRAST: CPT

## 2023-11-07 PROCEDURE — 6360000004 HC RX CONTRAST MEDICATION: Performed by: SURGERY

## 2023-11-07 RX ADMIN — IOHEXOL 50 ML: 240 INJECTION, SOLUTION INTRATHECAL; INTRAVASCULAR; INTRAVENOUS; ORAL at 10:35

## 2023-11-07 RX ADMIN — IOPAMIDOL 85 ML: 755 INJECTION, SOLUTION INTRAVENOUS at 10:34

## 2023-11-08 ENCOUNTER — TELEPHONE (OUTPATIENT)
Dept: FAMILY MEDICINE CLINIC | Age: 63
End: 2023-11-08

## 2023-11-08 ENCOUNTER — OFFICE VISIT (OUTPATIENT)
Dept: FAMILY MEDICINE CLINIC | Age: 63
End: 2023-11-08
Payer: COMMERCIAL

## 2023-11-08 VITALS
DIASTOLIC BLOOD PRESSURE: 78 MMHG | HEIGHT: 62 IN | WEIGHT: 160.4 LBS | HEART RATE: 75 BPM | TEMPERATURE: 97.8 F | RESPIRATION RATE: 16 BRPM | OXYGEN SATURATION: 97 % | SYSTOLIC BLOOD PRESSURE: 136 MMHG | BODY MASS INDEX: 29.52 KG/M2

## 2023-11-08 DIAGNOSIS — G44.209 TENSION-TYPE HEADACHE, NOT INTRACTABLE, UNSPECIFIED CHRONICITY PATTERN: ICD-10-CM

## 2023-11-08 DIAGNOSIS — H81.12 BPPV (BENIGN PAROXYSMAL POSITIONAL VERTIGO), LEFT: Primary | ICD-10-CM

## 2023-11-08 DIAGNOSIS — M54.2 CERVICALGIA: ICD-10-CM

## 2023-11-08 DIAGNOSIS — M26.629 TMJ SYNDROME: ICD-10-CM

## 2023-11-08 PROCEDURE — 99213 OFFICE O/P EST LOW 20 MIN: CPT | Performed by: FAMILY MEDICINE

## 2023-11-08 RX ORDER — AMOXICILLIN AND CLAVULANATE POTASSIUM 875; 125 MG/1; MG/1
1 TABLET, FILM COATED ORAL 2 TIMES DAILY
Qty: 20 TABLET | Refills: 0 | Status: SHIPPED | OUTPATIENT
Start: 2023-11-08 | End: 2023-11-18

## 2023-11-08 ASSESSMENT — ENCOUNTER SYMPTOMS: BACK PAIN: 1

## 2023-11-08 NOTE — TELEPHONE ENCOUNTER
Raghav Mann forgot to check and see if the Augmentin 500mg BID had been called in. She would like that called into [facility] in [facility].

## 2023-11-08 NOTE — TELEPHONE ENCOUNTER
I called and spoke to the patient. I let her know that Dr. Karyn Valderrama sent this prescription to [facility] for her. She voiced understanding.

## 2023-11-08 NOTE — PROGRESS NOTES
Mendez Lira (:  1960) is a 59 y.o. female,Established patient, here for evaluation of the following chief complaint(s):  Dizziness      Subjective   SUBJECTIVE/OBJECTIVE:  HPI  Pain located L side of neck  Went to [facility] on 11/3/2023---felt pressure increase when they went in a tunnel, restarted the dizziness  Popping while on the airplane  Treatment:  heating pad, prednisone  Worsens: after pressure changes  Improves: minimal help with changing in bra sizes and medications  Willing to do add'l evaluation at this time    Review of Systems   Constitutional:  Negative for activity change, appetite change, fever and unexpected weight change. Cardiovascular:  Negative for chest pain and leg swelling. Genitourinary:         No bowel or bladder changes   Musculoskeletal:  Positive for back pain. Negative for arthralgias and myalgias. Neurological:  Positive for headaches. Negative for dizziness. Objective   Physical Exam  Constitutional:       Appearance: Normal appearance. She is normal weight. HENT:      Head: Normocephalic and atraumatic. Cardiovascular:      Rate and Rhythm: Normal rate and regular rhythm. Heart sounds: No murmur heard. Pulmonary:      Effort: Pulmonary effort is normal.      Breath sounds: Normal breath sounds. No wheezing, rhonchi or rales. Musculoskeletal:      Comments: MS:  Spasm upper thoracic and L sided cervical muscles   Neurological:      Mental Status: She is alert. ASSESSMENT/PLAN:  1. BPPV (benign paroxysmal positional vertigo), left  -     CT HEAD W WO CONTRAST; Future  -     [facility] - [address]. [facility] to do VNG testing  2. Cervicalgia  -     CT HEAD W WO CONTRAST; Future  3. TMJ syndrome  4. Tension-type headache, not intractable, unspecified chronicity pattern  -     CT HEAD W WO CONTRAST; Future        Return if symptoms worsen or fail to improve. An electronic signature was used to authenticate this note.     --Rama Falcon

## 2023-11-13 ENCOUNTER — HOSPITAL ENCOUNTER (OUTPATIENT)
Age: 63
Discharge: HOME | End: 2023-11-13
Payer: COMMERCIAL

## 2023-11-13 ENCOUNTER — TELEPHONE (OUTPATIENT)
Dept: SURGERY | Age: 63
End: 2023-11-13

## 2023-11-13 ENCOUNTER — OFFICE VISIT (OUTPATIENT)
Dept: SURGERY | Age: 63
End: 2023-11-13
Payer: COMMERCIAL

## 2023-11-13 VITALS
TEMPERATURE: 97.8 F | HEIGHT: 61 IN | OXYGEN SATURATION: 96 % | RESPIRATION RATE: 18 BRPM | HEART RATE: 77 BPM | DIASTOLIC BLOOD PRESSURE: 70 MMHG | SYSTOLIC BLOOD PRESSURE: 136 MMHG | WEIGHT: 160.5 LBS | BODY MASS INDEX: 30.3 KG/M2

## 2023-11-13 DIAGNOSIS — R19.7 DIARRHEA, UNSPECIFIED TYPE: ICD-10-CM

## 2023-11-13 DIAGNOSIS — Z01.818 PRE-OP TESTING: ICD-10-CM

## 2023-11-13 DIAGNOSIS — N20.0 KIDNEY STONE: ICD-10-CM

## 2023-11-13 DIAGNOSIS — K43.2 INCISIONAL HERNIA, WITHOUT OBSTRUCTION OR GANGRENE: Primary | ICD-10-CM

## 2023-11-13 DIAGNOSIS — K43.2 INCISIONAL HERNIA, WITHOUT OBSTRUCTION OR GANGRENE: ICD-10-CM

## 2023-11-13 DIAGNOSIS — R42 VERTIGO: ICD-10-CM

## 2023-11-13 PROCEDURE — 93010 ELECTROCARDIOGRAM REPORT: CPT | Performed by: NUCLEAR MEDICINE

## 2023-11-13 PROCEDURE — 99215 OFFICE O/P EST HI 40 MIN: CPT | Performed by: SURGERY

## 2023-11-13 PROCEDURE — 93005 ELECTROCARDIOGRAM TRACING: CPT

## 2023-11-13 NOTE — PROGRESS NOTES
02/25/2017    HDL 55 02/25/2017    ALT 17 11/13/2023    AST 28 11/13/2023     11/13/2023    K 4.2 11/13/2023     11/13/2023    CREATININE 0.73 11/13/2023    BUN 27 (H) 11/13/2023    CO2 26 11/13/2023    TSH 1.330 02/25/2017       CT ABDOMEN PELVIS W IV CONTRAST Additional Contrast? Oral  Order: [id number]  Status: Final result     Visible to patient: Yes (seen)     Next appt: Today at 08:30 AM in General Surgery Nanette Montes MD)     Dx: Status post laparoscopic cholecystect. ..     0 Result Notes  Details    Reading Physician Reading Date Result Priority   Ron Henao MD  [phone] 11/7/2023      Narrative & Impression  PROCEDURE: CT ABDOMEN PELVIS W IV CONTRAST     CLINICAL INFORMATION: Diarrhea. Partial colon resection, Left upper quadrant abdominal pain. COMPARISON: CT abdomen and pelvis 3/15/2023. TECHNIQUE: Axial 5 mm CT images were obtained through the abdomen and pelvis after the administration of 85  cc Isovue 370 intravenous contrast. Coronal and sagittal reconstructions were obtained. All CT scans at this facility use dose modulation, iterative reconstruction, and/or weight-based dosing when appropriate to reduce radiation dose to as low as reasonably achievable. FINDINGS:   Lung bases: Dependent atelectasis is present. Liver/gallbladder/bilary tree: The gallbladder is absent. No biliary ductal dilatation is observed. Hepatomegaly and hepatic steatosis are unchanged. Pancreas: Normal.  Spleen : Normal.  Adrenal glands: Normal.     Kidneys/ ureters/ bladder: The urinary bladder is partially distended and grossly unremarkable. Punctate nonobstructing nephrolithiasis is stable. No hydronephrosis or hydroureter is visualized. Bilateral hypoattenuating renal lesions are stable. Gastrointestinal:  Colonic diverticulosis without diverticulitis is observed. Colonic wall thickening most notable in the distal colon may be artifactual related to underdistention.  Mild

## 2023-11-14 LAB
ABSOLUTE BASO #: 0.05 K/UL (ref 0–0.2)
ABSOLUTE EOS #: 0.24 K/UL (ref 0–0.5)
ABSOLUTE LYMPH #: 1.53 K/UL (ref 1–4)
ABSOLUTE MONO #: 0.67 K/UL (ref 0.2–1)
ABSOLUTE NEUT #: 3.78 K/UL (ref 1.5–7.5)
ALBUMIN SERPL-MCNC: 4.4 G/DL (ref 3.5–5.2)
ALK PHOSPHATASE: 86 U/L (ref 40–140)
ALT SERPL-CCNC: 17 U/L (ref 5–40)
ANION GAP SERPL CALCULATED.3IONS-SCNC: 12 MEQ/L (ref 7–16)
AST SERPL-CCNC: 28 U/L (ref 9–40)
BASOPHILS RELATIVE PERCENT: 0.8 %
BILIRUB SERPL-MCNC: 0.5 MG/DL
BUN BLDV-MCNC: 27 MG/DL (ref 8–23)
CALCIUM SERPL-MCNC: 8.9 MG/DL (ref 8.5–10.5)
CHLORIDE BLD-SCNC: 101 MEQ/L (ref 95–107)
CO2: 26 MEQ/L (ref 19–31)
CREAT SERPL-MCNC: 0.73 MG/DL (ref 0.6–1.3)
EGFR IF NONAFRICAN AMERICAN: 93 ML/MIN/1.73
EOSINOPHILS RELATIVE PERCENT: 3.8 %
GLUCOSE: 84 MG/DL (ref 70–99)
HCT VFR BLD CALC: 38.4 % (ref 34–45)
HEMOGLOBIN: 14 G/DL (ref 11.5–15.5)
LYMPHOCYTE %: 24.3 %
MCH RBC QN AUTO: 35 PG (ref 25–33)
MCHC RBC AUTO-ENTMCNC: 36.5 G/DL (ref 31–36)
MCV RBC AUTO: 96 FL (ref 80–99)
MONOCYTES # BLD: 10.7 %
NEUTROPHILS RELATIVE PERCENT: 60.1 %
PDW BLD-RTO: 13.3 % (ref 11.5–15)
PLATELETS: 352 K/UL (ref 130–400)
PMV BLD AUTO: 9.8 FL (ref 9.3–13)
POTASSIUM SERPL-SCNC: 4.2 MEQ/L (ref 3.5–5.4)
RBC: 4 M/UL (ref 3.8–5.4)
SODIUM BLD-SCNC: 139 MEQ/L (ref 133–146)
TOTAL PROTEIN: 6.9 G/DL (ref 6.1–8.3)
WBC: 6.3 K/UL (ref 3.5–11)

## 2023-11-14 ASSESSMENT — ENCOUNTER SYMPTOMS
BLOOD IN STOOL: 0
WHEEZING: 0
COUGH: 0
TROUBLE SWALLOWING: 0
ABDOMINAL PAIN: 1
VOICE CHANGE: 0
NAUSEA: 0
SORE THROAT: 0
SHORTNESS OF BREATH: 0
COLOR CHANGE: 0
VOMITING: 0
DIARRHEA: 1
ABDOMINAL DISTENTION: 1

## 2023-11-15 ENCOUNTER — HOSPITAL ENCOUNTER (OUTPATIENT)
Dept: CT IMAGING | Age: 63
Discharge: HOME | End: 2023-11-15
Attending: FAMILY MEDICINE
Payer: COMMERCIAL

## 2023-11-15 DIAGNOSIS — H81.12 BPPV (BENIGN PAROXYSMAL POSITIONAL VERTIGO), LEFT: ICD-10-CM

## 2023-11-15 DIAGNOSIS — G44.209 TENSION-TYPE HEADACHE, NOT INTRACTABLE, UNSPECIFIED CHRONICITY PATTERN: ICD-10-CM

## 2023-11-15 DIAGNOSIS — M54.2 CERVICALGIA: ICD-10-CM

## 2023-11-15 LAB
ALP SERPL-CCNC: 91 U/L (ref 38–126)
ALT SERPL W/O P-5'-P-CCNC: 25 U/L (ref 11–66)
AST SERPL-CCNC: 42 U/L (ref 5–40)
BASOPHILS ABSOLUTE: 0 THOU/MM3 (ref 0–0.1)
BASOPHILS NFR BLD AUTO: 0.6 %
CK SERPL-CCNC: 43 U/L (ref 30–135)
CREAT SERPL-MCNC: 0.6 MG/DL (ref 0.4–1.2)
CRP SERPL-MCNC: < 0.3 MG/DL (ref 0–1)
DEPRECATED RDW RBC AUTO: 51.3 FL (ref 35–45)
EOSINOPHIL NFR BLD AUTO: 4.9 %
EOSINOPHILS ABSOLUTE: 0.3 THOU/MM3 (ref 0–0.4)
ERYTHROCYTE [DISTWIDTH] IN BLOOD BY AUTOMATED COUNT: 14 % (ref 11.5–14.5)
GFR SERPL CREATININE-BSD FRML MDRD: > 60 ML/MIN/1.73M2
HCT VFR BLD AUTO: 37.2 % (ref 37–47)
HGB BLD-MCNC: 12.7 GM/DL (ref 12–16)
IMM GRANULOCYTES # BLD AUTO: 0.02 THOU/MM3 (ref 0–0.07)
IMM GRANULOCYTES NFR BLD AUTO: 0.4 %
LYMPHOCYTES ABSOLUTE: 1.2 THOU/MM3 (ref 1–4.8)
LYMPHOCYTES NFR BLD AUTO: 21.9 %
MCH RBC QN AUTO: 34.6 PG (ref 26–33)
MCHC RBC AUTO-ENTMCNC: 34.1 GM/DL (ref 32.2–35.5)
MCV RBC AUTO: 101.4 FL (ref 81–99)
MONOCYTES ABSOLUTE: 0.6 THOU/MM3 (ref 0.4–1.3)
MONOCYTES NFR BLD AUTO: 11.2 %
NEUTROPHILS NFR BLD AUTO: 61 %
NRBC BLD AUTO-RTO: 0 /100 WBC
NT-PROBNP SERPL IA-MCNC: 201.1 PG/ML (ref 0–124)
PLATELET # BLD AUTO: 291 THOU/MM3 (ref 130–400)
PMV BLD AUTO: 10.1 FL (ref 9.4–12.4)
RBC # BLD AUTO: 3.67 MILL/MM3 (ref 4.2–5.4)
SEGMENTED NEUTROPHILS ABSOLUTE COUNT: 3.2 THOU/MM3 (ref 1.8–7.7)
URATE SERPL-MCNC: 2.2 MG/DL (ref 2.4–5.7)
WBC # BLD AUTO: 5.3 THOU/MM3 (ref 4.8–10.8)

## 2023-11-15 PROCEDURE — 84450 TRANSFERASE (AST) (SGOT): CPT

## 2023-11-15 PROCEDURE — 86140 C-REACTIVE PROTEIN: CPT

## 2023-11-15 PROCEDURE — 70470 CT HEAD/BRAIN W/O & W/DYE: CPT

## 2023-11-15 PROCEDURE — 84550 ASSAY OF BLOOD/URIC ACID: CPT

## 2023-11-15 PROCEDURE — 82565 ASSAY OF CREATININE: CPT

## 2023-11-15 PROCEDURE — 83880 ASSAY OF NATRIURETIC PEPTIDE: CPT

## 2023-11-15 PROCEDURE — 36415 COLL VENOUS BLD VENIPUNCTURE: CPT

## 2023-11-15 PROCEDURE — 84460 ALANINE AMINO (ALT) (SGPT): CPT

## 2023-11-15 PROCEDURE — 86235 NUCLEAR ANTIGEN ANTIBODY: CPT

## 2023-11-15 PROCEDURE — 82550 ASSAY OF CK (CPK): CPT

## 2023-11-15 PROCEDURE — 85025 COMPLETE CBC W/AUTO DIFF WBC: CPT

## 2023-11-15 PROCEDURE — 6360000004 HC RX CONTRAST MEDICATION: Performed by: FAMILY MEDICINE

## 2023-11-15 PROCEDURE — 84075 ASSAY ALKALINE PHOSPHATASE: CPT

## 2023-11-15 RX ADMIN — IOPAMIDOL 65 ML: 755 INJECTION, SOLUTION INTRAVENOUS at 08:53

## 2023-11-19 LAB
EKG ATRIAL RATE: 68 BPM
EKG P AXIS: 80 DEGREES
EKG P-R INTERVAL: 184 MS
EKG Q-T INTERVAL: 416 MS
EKG QRS DURATION: 74 MS
EKG QTC CALCULATION (BAZETT): 442 MS
EKG R AXIS: 43 DEGREES
EKG T AXIS: 61 DEGREES
EKG VENTRICULAR RATE: 68 BPM

## 2023-11-23 ENCOUNTER — TELEPHONE (OUTPATIENT)
Dept: FAMILY MEDICINE CLINIC | Age: 63
End: 2023-11-23

## 2023-11-24 RX ORDER — CYANOCOBALAMIN 1000 UG/ML
1000 INJECTION, SOLUTION INTRAMUSCULAR; SUBCUTANEOUS
Qty: 3 ML | Refills: 3 | Status: SHIPPED | OUTPATIENT
Start: 2023-11-24

## 2023-11-28 ENCOUNTER — HOSPITAL ENCOUNTER (OUTPATIENT)
Dept: AUDIOLOGY | Age: 63
Discharge: HOME | End: 2023-11-28
Payer: COMMERCIAL

## 2023-11-28 PROCEDURE — 92567 TYMPANOMETRY: CPT | Performed by: AUDIOLOGIST

## 2023-11-28 PROCEDURE — 92537 CALORIC VSTBLR TEST W/REC: CPT | Performed by: AUDIOLOGIST

## 2023-11-28 PROCEDURE — 92540 BASIC VESTIBULAR EVALUATION: CPT | Performed by: AUDIOLOGIST
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
## 2023-11-29 ENCOUNTER — HOSPITAL ENCOUNTER (OUTPATIENT)
Age: 63
Discharge: HOME | End: 2023-11-30
Attending: SURGERY | Admitting: SURGERY
Payer: COMMERCIAL

## 2023-11-29 ENCOUNTER — ANESTHESIA EVENT (OUTPATIENT)
Dept: OPERATING ROOM | Age: 63
End: 2023-11-29
Payer: COMMERCIAL

## 2023-11-29 ENCOUNTER — ANESTHESIA (OUTPATIENT)
Dept: OPERATING ROOM | Age: 63
End: 2023-11-29
Payer: COMMERCIAL

## 2023-11-29 DIAGNOSIS — K43.0 INCISIONAL HERNIA WITH OBSTRUCTION BUT NO GANGRENE: Primary | ICD-10-CM

## 2023-11-29 PROBLEM — Z98.890 STATUS POST REPAIR OF VENTRAL HERNIA: Status: ACTIVE | Noted: 2023-11-29

## 2023-11-29 PROBLEM — Z87.19 STATUS POST REPAIR OF VENTRAL HERNIA: Status: ACTIVE | Noted: 2023-11-29

## 2023-11-29 LAB
ANION GAP SERPL CALC-SCNC: 10 MEQ/L (ref 8–16)
BUN SERPL-MCNC: 23 MG/DL (ref 7–22)
CALCIUM SERPL-MCNC: 8.9 MG/DL (ref 8.5–10.5)
CENTROMERE ANTIBODY: > 240 U/ML
CHLORIDE SERPL-SCNC: 104 MEQ/L (ref 98–111)
CO2 SERPL-SCNC: 25 MEQ/L (ref 23–33)
CREAT SERPL-MCNC: 0.6 MG/DL (ref 0.4–1.2)
GFR SERPL CREATININE-BSD FRML MDRD: > 60 ML/MIN/1.73M2
GLUCOSE BLD STRIP.AUTO-MCNC: 132 MG/DL (ref 70–108)
GLUCOSE SERPL-MCNC: 97 MG/DL (ref 70–108)
POTASSIUM SERPL-SCNC: 3.6 MEQ/L (ref 3.5–5.2)
SODIUM SERPL-SCNC: 139 MEQ/L (ref 135–145)

## 2023-11-29 PROCEDURE — S2900 ROBOTIC SURGICAL SYSTEM: HCPCS | Performed by: SURGERY

## 2023-11-29 PROCEDURE — 6360000002 HC RX W HCPCS

## 2023-11-29 PROCEDURE — 6360000002 HC RX W HCPCS: Performed by: ANESTHESIOLOGY

## 2023-11-29 PROCEDURE — 7100000000 HC PACU RECOVERY - FIRST 15 MIN: Performed by: SURGERY

## 2023-11-29 PROCEDURE — C1781 MESH (IMPLANTABLE): HCPCS | Performed by: SURGERY

## 2023-11-29 PROCEDURE — 6370000000 HC RX 637 (ALT 250 FOR IP): Performed by: SURGERY

## 2023-11-29 PROCEDURE — 2580000003 HC RX 258: Performed by: SURGERY

## 2023-11-29 PROCEDURE — 6370000000 HC RX 637 (ALT 250 FOR IP): Performed by: ANESTHESIOLOGY

## 2023-11-29 PROCEDURE — 7100000011 HC PHASE II RECOVERY - ADDTL 15 MIN: Performed by: SURGERY

## 2023-11-29 PROCEDURE — 6360000002 HC RX W HCPCS: Performed by: SURGERY

## 2023-11-29 PROCEDURE — 2500000003 HC RX 250 WO HCPCS

## 2023-11-29 PROCEDURE — 2500000003 HC RX 250 WO HCPCS: Performed by: ANESTHESIOLOGY

## 2023-11-29 PROCEDURE — 82948 REAGENT STRIP/BLOOD GLUCOSE: CPT

## 2023-11-29 PROCEDURE — 3600000019 HC SURGERY ROBOT ADDTL 15MIN: Performed by: SURGERY

## 2023-11-29 PROCEDURE — 3600000009 HC SURGERY ROBOT BASE: Performed by: SURGERY

## 2023-11-29 PROCEDURE — 2709999900 HC NON-CHARGEABLE SUPPLY: Performed by: SURGERY

## 2023-11-29 PROCEDURE — 80048 BASIC METABOLIC PNL TOTAL CA: CPT

## 2023-11-29 PROCEDURE — 36415 COLL VENOUS BLD VENIPUNCTURE: CPT

## 2023-11-29 PROCEDURE — 3700000001 HC ADD 15 MINUTES (ANESTHESIA): Performed by: SURGERY

## 2023-11-29 PROCEDURE — 3700000000 HC ANESTHESIA ATTENDED CARE: Performed by: SURGERY

## 2023-11-29 PROCEDURE — 7100000010 HC PHASE II RECOVERY - FIRST 15 MIN: Performed by: SURGERY

## 2023-11-29 PROCEDURE — 7100000001 HC PACU RECOVERY - ADDTL 15 MIN: Performed by: SURGERY

## 2023-11-29 DEVICE — MESH HERN DIA8IN CIR W/ PRE ATTCH LO PROF BLLN AND ECHO PS: Type: IMPLANTABLE DEVICE | Site: ABDOMEN | Status: FUNCTIONAL

## 2023-11-29 RX ORDER — LIDOCAINE HCL/PF 100 MG/5ML
SYRINGE (ML) INJECTION PRN
Status: DISCONTINUED | OUTPATIENT
Start: 2023-11-29 | End: 2023-11-29 | Stop reason: SDUPTHER

## 2023-11-29 RX ORDER — FENTANYL CITRATE 50 UG/ML
50 INJECTION, SOLUTION INTRAMUSCULAR; INTRAVENOUS EVERY 5 MIN PRN
Status: DISCONTINUED | OUTPATIENT
Start: 2023-11-29 | End: 2023-11-29 | Stop reason: HOSPADM

## 2023-11-29 RX ORDER — SODIUM CHLORIDE 0.9 % (FLUSH) 0.9 %
5-40 SYRINGE (ML) INJECTION PRN
Status: DISCONTINUED | OUTPATIENT
Start: 2023-11-29 | End: 2023-11-29 | Stop reason: HOSPADM

## 2023-11-29 RX ORDER — SCOLOPAMINE TRANSDERMAL SYSTEM 1 MG/1
1 PATCH, EXTENDED RELEASE TRANSDERMAL ONCE
Status: DISCONTINUED | OUTPATIENT
Start: 2023-11-29 | End: 2023-11-30 | Stop reason: HOSPADM

## 2023-11-29 RX ORDER — ACETAMINOPHEN 325 MG/1
650 TABLET ORAL EVERY 4 HOURS PRN
Status: DISCONTINUED | OUTPATIENT
Start: 2023-11-29 | End: 2023-11-30 | Stop reason: HOSPADM

## 2023-11-29 RX ORDER — SODIUM CHLORIDE 0.9 % (FLUSH) 0.9 %
5-40 SYRINGE (ML) INJECTION EVERY 12 HOURS SCHEDULED
Status: DISCONTINUED | OUTPATIENT
Start: 2023-11-29 | End: 2023-11-30 | Stop reason: HOSPADM

## 2023-11-29 RX ORDER — SODIUM CHLORIDE 0.9 % (FLUSH) 0.9 %
5-40 SYRINGE (ML) INJECTION EVERY 12 HOURS SCHEDULED
Status: DISCONTINUED | OUTPATIENT
Start: 2023-11-29 | End: 2023-11-29 | Stop reason: HOSPADM

## 2023-11-29 RX ORDER — MORPHINE SULFATE 4 MG/ML
4 INJECTION, SOLUTION INTRAMUSCULAR; INTRAVENOUS
Status: DISCONTINUED | OUTPATIENT
Start: 2023-11-29 | End: 2023-11-30 | Stop reason: HOSPADM

## 2023-11-29 RX ORDER — TRAMADOL HYDROCHLORIDE 50 MG/1
50 TABLET ORAL EVERY 6 HOURS PRN
Qty: 28 TABLET | Refills: 0 | Status: SHIPPED | OUTPATIENT
Start: 2023-11-29 | End: 2023-12-06

## 2023-11-29 RX ORDER — KETOROLAC TROMETHAMINE 30 MG/ML
INJECTION, SOLUTION INTRAMUSCULAR; INTRAVENOUS PRN
Status: DISCONTINUED | OUTPATIENT
Start: 2023-11-29 | End: 2023-11-29 | Stop reason: SDUPTHER

## 2023-11-29 RX ORDER — DEXAMETHASONE SODIUM PHOSPHATE 4 MG/ML
8 INJECTION, SOLUTION INTRA-ARTICULAR; INTRALESIONAL; INTRAMUSCULAR; INTRAVENOUS; SOFT TISSUE ONCE
Status: COMPLETED | OUTPATIENT
Start: 2023-11-29 | End: 2023-11-29

## 2023-11-29 RX ORDER — FENTANYL CITRATE 50 UG/ML
INJECTION, SOLUTION INTRAMUSCULAR; INTRAVENOUS PRN
Status: DISCONTINUED | OUTPATIENT
Start: 2023-11-29 | End: 2023-11-29 | Stop reason: SDUPTHER

## 2023-11-29 RX ORDER — ROCURONIUM BROMIDE 10 MG/ML
INJECTION, SOLUTION INTRAVENOUS PRN
Status: DISCONTINUED | OUTPATIENT
Start: 2023-11-29 | End: 2023-11-29 | Stop reason: SDUPTHER

## 2023-11-29 RX ORDER — MORPHINE SULFATE 2 MG/ML
2 INJECTION, SOLUTION INTRAMUSCULAR; INTRAVENOUS
Status: DISCONTINUED | OUTPATIENT
Start: 2023-11-29 | End: 2023-11-30 | Stop reason: HOSPADM

## 2023-11-29 RX ORDER — SODIUM CHLORIDE 9 MG/ML
INJECTION, SOLUTION INTRAVENOUS PRN
Status: DISCONTINUED | OUTPATIENT
Start: 2023-11-29 | End: 2023-11-29 | Stop reason: HOSPADM

## 2023-11-29 RX ORDER — FENTANYL CITRATE 50 UG/ML
50 INJECTION, SOLUTION INTRAMUSCULAR; INTRAVENOUS EVERY 5 MIN PRN
Status: COMPLETED | OUTPATIENT
Start: 2023-11-29 | End: 2023-11-29

## 2023-11-29 RX ORDER — INSULIN LISPRO 100 [IU]/ML
0-4 INJECTION, SOLUTION INTRAVENOUS; SUBCUTANEOUS NIGHTLY
Status: DISCONTINUED | OUTPATIENT
Start: 2023-11-29 | End: 2023-11-30 | Stop reason: HOSPADM

## 2023-11-29 RX ORDER — ONDANSETRON 2 MG/ML
4 INJECTION INTRAMUSCULAR; INTRAVENOUS EVERY 6 HOURS PRN
Status: DISCONTINUED | OUTPATIENT
Start: 2023-11-29 | End: 2023-11-30 | Stop reason: HOSPADM

## 2023-11-29 RX ORDER — INSULIN LISPRO 100 [IU]/ML
0-4 INJECTION, SOLUTION INTRAVENOUS; SUBCUTANEOUS
Status: DISCONTINUED | OUTPATIENT
Start: 2023-11-30 | End: 2023-11-30 | Stop reason: HOSPADM

## 2023-11-29 RX ORDER — SODIUM CHLORIDE 9 MG/ML
INJECTION, SOLUTION INTRAVENOUS CONTINUOUS
Status: DISCONTINUED | OUTPATIENT
Start: 2023-11-29 | End: 2023-11-30 | Stop reason: HOSPADM

## 2023-11-29 RX ORDER — TRAMADOL HYDROCHLORIDE 50 MG/1
100 TABLET ORAL EVERY 6 HOURS PRN
Status: DISCONTINUED | OUTPATIENT
Start: 2023-11-29 | End: 2023-11-30 | Stop reason: HOSPADM

## 2023-11-29 RX ORDER — PROPOFOL 10 MG/ML
INJECTION, EMULSION INTRAVENOUS PRN
Status: DISCONTINUED | OUTPATIENT
Start: 2023-11-29 | End: 2023-11-29 | Stop reason: SDUPTHER

## 2023-11-29 RX ORDER — FENTANYL CITRATE 50 UG/ML
INJECTION, SOLUTION INTRAMUSCULAR; INTRAVENOUS
Status: COMPLETED
Start: 2023-11-29 | End: 2023-11-29

## 2023-11-29 RX ORDER — ACETAMINOPHEN 500 MG
1000 TABLET ORAL ONCE
Status: COMPLETED | OUTPATIENT
Start: 2023-11-29 | End: 2023-11-29

## 2023-11-29 RX ORDER — ONDANSETRON 2 MG/ML
INJECTION INTRAMUSCULAR; INTRAVENOUS PRN
Status: DISCONTINUED | OUTPATIENT
Start: 2023-11-29 | End: 2023-11-29 | Stop reason: SDUPTHER

## 2023-11-29 RX ORDER — DEXAMETHASONE SODIUM PHOSPHATE 10 MG/ML
INJECTION, EMULSION INTRAMUSCULAR; INTRAVENOUS PRN
Status: DISCONTINUED | OUTPATIENT
Start: 2023-11-29 | End: 2023-11-29 | Stop reason: SDUPTHER

## 2023-11-29 RX ORDER — ONDANSETRON 4 MG/1
4 TABLET, ORALLY DISINTEGRATING ORAL EVERY 8 HOURS PRN
Status: DISCONTINUED | OUTPATIENT
Start: 2023-11-29 | End: 2023-11-30 | Stop reason: HOSPADM

## 2023-11-29 RX ORDER — SODIUM CHLORIDE 0.9 % (FLUSH) 0.9 %
5-40 SYRINGE (ML) INJECTION PRN
Status: DISCONTINUED | OUTPATIENT
Start: 2023-11-29 | End: 2023-11-30 | Stop reason: HOSPADM

## 2023-11-29 RX ORDER — SODIUM CHLORIDE 9 MG/ML
INJECTION, SOLUTION INTRAVENOUS CONTINUOUS
Status: DISCONTINUED | OUTPATIENT
Start: 2023-11-29 | End: 2023-11-29 | Stop reason: HOSPADM

## 2023-11-29 RX ORDER — BUPIVACAINE HYDROCHLORIDE 5 MG/ML
INJECTION, SOLUTION PERINEURAL PRN
Status: DISCONTINUED | OUTPATIENT
Start: 2023-11-29 | End: 2023-11-29 | Stop reason: ALTCHOICE

## 2023-11-29 RX ORDER — SODIUM CHLORIDE 9 MG/ML
INJECTION, SOLUTION INTRAVENOUS PRN
Status: DISCONTINUED | OUTPATIENT
Start: 2023-11-29 | End: 2023-11-30 | Stop reason: HOSPADM

## 2023-11-29 RX ORDER — CELECOXIB 200 MG/1
200 CAPSULE ORAL ONCE
Status: COMPLETED | OUTPATIENT
Start: 2023-11-29 | End: 2023-11-29

## 2023-11-29 RX ORDER — TRAMADOL HYDROCHLORIDE 50 MG/1
50 TABLET ORAL EVERY 6 HOURS PRN
Status: DISCONTINUED | OUTPATIENT
Start: 2023-11-29 | End: 2023-11-30 | Stop reason: HOSPADM

## 2023-11-29 RX ADMIN — MORPHINE SULFATE 4 MG: 4 INJECTION, SOLUTION INTRAMUSCULAR; INTRAVENOUS at 22:11

## 2023-11-29 RX ADMIN — SUGAMMADEX 200 MG: 100 INJECTION, SOLUTION INTRAVENOUS at 14:16

## 2023-11-29 RX ADMIN — ROCURONIUM BROMIDE 50 MG: 10 INJECTION INTRAVENOUS at 12:51

## 2023-11-29 RX ADMIN — HYDROMORPHONE HYDROCHLORIDE 0.5 MG: 1 INJECTION, SOLUTION INTRAMUSCULAR; INTRAVENOUS; SUBCUTANEOUS at 14:43

## 2023-11-29 RX ADMIN — FENTANYL CITRATE 50 MCG: 50 INJECTION INTRAMUSCULAR; INTRAVENOUS at 14:38

## 2023-11-29 RX ADMIN — DEXAMETHASONE SODIUM PHOSPHATE 8 MG: 4 INJECTION, SOLUTION INTRA-ARTICULAR; INTRALESIONAL; INTRAMUSCULAR; INTRAVENOUS; SOFT TISSUE at 09:40

## 2023-11-29 RX ADMIN — ACETAMINOPHEN 650 MG: 325 TABLET ORAL at 16:45

## 2023-11-29 RX ADMIN — Medication 60 MG: at 12:51

## 2023-11-29 RX ADMIN — ROCURONIUM BROMIDE 20 MG: 10 INJECTION INTRAVENOUS at 13:43

## 2023-11-29 RX ADMIN — CELECOXIB 200 MG: 200 CAPSULE ORAL at 09:37

## 2023-11-29 RX ADMIN — FENTANYL CITRATE 50 MCG: 50 INJECTION INTRAMUSCULAR; INTRAVENOUS at 14:53

## 2023-11-29 RX ADMIN — PROPOFOL 150 MG: 10 INJECTION, EMULSION INTRAVENOUS at 12:51

## 2023-11-29 RX ADMIN — FENTANYL CITRATE 50 MCG: 50 INJECTION INTRAMUSCULAR; INTRAVENOUS at 14:58

## 2023-11-29 RX ADMIN — ACETAMINOPHEN 1000 MG: 500 TABLET ORAL at 09:37

## 2023-11-29 RX ADMIN — DEXAMETHASONE SODIUM PHOSPHATE 10 MG: 10 INJECTION, EMULSION INTRAMUSCULAR; INTRAVENOUS at 12:51

## 2023-11-29 RX ADMIN — SODIUM CHLORIDE: 9 INJECTION, SOLUTION INTRAVENOUS at 09:35

## 2023-11-29 RX ADMIN — SODIUM CHLORIDE: 9 INJECTION, SOLUTION INTRAVENOUS at 22:21

## 2023-11-29 RX ADMIN — FENTANYL CITRATE 100 MCG: 50 INJECTION, SOLUTION INTRAMUSCULAR; INTRAVENOUS at 12:51

## 2023-11-29 RX ADMIN — Medication 2000 MG: at 12:41

## 2023-11-29 RX ADMIN — MORPHINE SULFATE 2 MG: 2 INJECTION, SOLUTION INTRAMUSCULAR; INTRAVENOUS at 16:46

## 2023-11-29 RX ADMIN — TRAMADOL HYDROCHLORIDE 100 MG: 50 TABLET, COATED ORAL at 15:46

## 2023-11-29 RX ADMIN — ONDANSETRON 4 MG: 2 INJECTION INTRAMUSCULAR; INTRAVENOUS at 14:16

## 2023-11-29 RX ADMIN — KETOROLAC TROMETHAMINE 30 MG: 30 INJECTION, SOLUTION INTRAMUSCULAR; INTRAVENOUS at 14:16

## 2023-11-29 RX ADMIN — HYDROMORPHONE HYDROCHLORIDE 0.5 MG: 1 INJECTION, SOLUTION INTRAMUSCULAR; INTRAVENOUS; SUBCUTANEOUS at 14:48

## 2023-11-29 RX ADMIN — ROCURONIUM BROMIDE 20 MG: 10 INJECTION INTRAVENOUS at 13:22

## 2023-11-29 RX ADMIN — FENTANYL CITRATE 50 MCG: 50 INJECTION INTRAMUSCULAR; INTRAVENOUS at 14:33

## 2023-11-29 ASSESSMENT — PAIN SCALES - GENERAL
PAINLEVEL_OUTOF10: 6
PAINLEVEL_OUTOF10: 9
PAINLEVEL_OUTOF10: 6
PAINLEVEL_OUTOF10: 7
PAINLEVEL_OUTOF10: 5
PAINLEVEL_OUTOF10: 5
PAINLEVEL_OUTOF10: 6

## 2023-11-29 ASSESSMENT — PAIN - FUNCTIONAL ASSESSMENT
PAIN_FUNCTIONAL_ASSESSMENT: 0-10
PAIN_FUNCTIONAL_ASSESSMENT: PREVENTS OR INTERFERES SOME ACTIVE ACTIVITIES AND ADLS

## 2023-11-29 ASSESSMENT — PAIN DESCRIPTION - LOCATION
LOCATION: ABDOMEN

## 2023-11-29 ASSESSMENT — PAIN DESCRIPTION - ORIENTATION: ORIENTATION: MID

## 2023-11-29 ASSESSMENT — PAIN DESCRIPTION - DESCRIPTORS
DESCRIPTORS: CRAMPING
DESCRIPTORS: DISCOMFORT;SORE
DESCRIPTORS: CRAMPING

## 2023-11-29 ASSESSMENT — PAIN DESCRIPTION - PAIN TYPE: TYPE: SURGICAL PAIN

## 2023-11-29 NOTE — H&P
followed by a meal within 20 min) 90 capsule 3      No current facility-administered medications for this visit. Allergies           Allergies   Allergen Reactions    Vicodin [Hydrocodone-Acetaminophen] Nausea Only       Pupils dialated    Duricef [Cefadroxil] Nausea And Vomiting    Tape [Adhesive Tape] Rash       Family History  Family History         Family History   Problem Relation Age of Onset    Arthritis Mother      Heart Disease Mother      High Blood Pressure Father      Cancer Father           kidney  prostate          SocialHistory  Social History               Socioeconomic History    Marital status:        Spouse name: Not on file    Number of children: Not on file    Years of education: Not on file    Highest education level: Not on file   Occupational History       Employer: [employer]   Tobacco Use    Smoking status: Never    Smokeless tobacco: Never   Vaping Use    Vaping Use: Never used   Substance and Sexual Activity    Alcohol use: Yes       Comment: social    Drug use: No    Sexual activity: Not on file   Other Topics Concern    Not on file   Social History Narrative    Not on file      Social Determinants of Health           Financial Resource Strain: Low Risk  (8/28/2023)     Overall Financial Resource Strain (CARDIA)      Difficulty of Paying Living Expenses: Not hard at all   Food Insecurity: No Food Insecurity (8/28/2023)     Hunger Vital Sign      Worried About Running Out of Food in the Last Year: Never true      [address] in the Last Year: Never true   Transportation Needs: Unknown (8/28/2023)     PRAPARE - Transportation      Lack of Transportation (Medical): Not on file      Lack of Transportation (Non-Medical):  No   Physical Activity: Not on file   Stress: Not on file   Social Connections: Not on file   Intimate Partner Violence: Not on file   Housing Stability: Unknown (8/28/2023)     Housing Stability Vital Sign      Unable to Pay for

## 2023-11-29 NOTE — PROGRESS NOTES
22 Laurie Burnett  report taken from [facility]. 1730  pt. States pain is better. Pt. States pain is 5 out of 10. Pt. Attempted to get out of bed with help. Pt. Complained of severe pain. Pt. States she would like to stay overnight. Dr. Evelyn Davies notified. 1800  pt. Waiting for room to be cleaned. 1900  pt. Resting quietly. 1945  report called to [facility]  o2 discontinued. 2030  phase II criteria met. Pt. Stable. Transfer to .

## 2023-11-29 NOTE — DISCHARGE INSTRUCTIONS
DR NERI'S DISCHARGE INSTRUCTIONS    Pt Name:  Trung Fink Record Number: [id number]  Today's Date: 11/29/2023    GENERAL ANESTHESIA OR SEDATION  1. Do not drive or operate hazardous machinery for 24 hours. 2. Do not make important business or personal decisions for 24 hours. 3. Do not drink alcoholic beverages or use tobacco for 24 hours. ACTIVITY INSTRUCTIONS:  [] Rest today. Resume light to normal activity tomorrow.   [] You may resume normal activity tomorrow. Do not engage in strenuous activity that may place stress on your incision. [x] Do not drive for 3-5 days and avoid heavy lifting, tugging, pullings greater than 10-20 lbs until seen in the office. DIET INSTRUCTIONS:  []Begin with clear liquids. If not nauseated, may increase to a low-fat diet when you desire. Greasy and spicy foods are not advised. [x]Regular diet as tolerated. []Other:     MEDICATIONS  [x]Prescription sent with you to be used as directed. []Lortab   [x]Tramadol   []Percocet   []Tylenol #3   []Oxycontin   Do not drink alcohol or drive while taking these medications. You may experience dizziness or drowsiness with these medications. You may also experience constipation which can be relieved with stool softners or laxatives. [x]You may resume your daily prescription medication schedule unless otherwise specified. [x]Do not take 325mg Aspirin or other blood thinners such as Coumadin or Plavix for 5 days. WOUND/DRESSING INSTRUCTIONS:  Always ensure you and your care giver clean hands before and after caring for the wound. [] Keep dressing clean and dry for 48 hours. Change when soiled or wet. [] Allow steri-strips to fall off on their own.   [] Ice operative site for 20 minutes 4 times a day. [x] May wash over incision in shower in 48 hours, but do not soak in a bath.  [] Take sitz bath for 20 minutes twice daily and after bowel movements. [x] Keep the abdominal binder in place during the day.  May remove

## 2023-11-29 NOTE — PROGRESS NOTES
Pt returned to [facility] room 10. Vitals and assessment as charted. 0.9 infusing, @450ml to count from PACU. Pt has crackers and water. Family at the bedside. Pt and family verbalized understanding of discharge criteria and call light use. Call light in reach.

## 2023-11-29 NOTE — PROGRESS NOTES
Patient oriented to Same Day department and admitted to Same Day Surgery room 10. Patient verbalized approval for first name, last initial with physician name on unit whiteboard. Plan of care reviewed with patient. Patient room whiteboard filled out and discussed with patient and responsible adult. Patient and responsible adult offered Same Day Welcome Packet to review. Call light in reach. Bed in lowest position, locked, with one bed rail up. SCDs and warming blanket in place. Appropriate arm bands on patient. Bathroom offered. All questions and concerns of patient addressed. Meds to Beds:   Patient informed of  [facility] program during admission.  Patient is agreeable to program.   Contact information for the pharmacy and the [facility] program:   Name: daniella   Relationship to patient:child   Phone number: [phone]

## 2023-11-29 NOTE — PROGRESS NOTES
1424: pt arrives to pacu, respirations unlabored on room air, Pt placed on nasal cannula 4L. 1433: pain 9/10, medicated with 50mcg fentanyl. 1438: pain unchanged, medicated with 50 mcg fentanyl  1443: pain 6/10, medicated with .5 mg dilaudid  1448: pain unchanged, medicated with .5mg dilaudid  1453: pain 7/10, medicated with 50mcg fentanyl. 1458: pain unchanged, medicated with 50mcg fentanyl. 1503: pt resting, resps easy, awakens to voice. Pain 4/10 and tolerable  1515: pain 5/10 and tolerable. 1518: pt meets criteria for discharge from pacu at this time. pt transported to [facility] in stable condition. Report given to [facility].

## 2023-11-29 NOTE — OP NOTE
[address]  Operative Report    PATIENT NAME: Mendez Lira  MEDICAL RECORD NO. [id number]  SURGEON: Jennifer Salcedo MD   Primary Care Physician: Ann Stoll DO  Date: 11/29/2023, 2:21 PM     PROCEDURE PERFORMED: Robotic assisted laparoscopic repair of incarcerated 10 cm upper abdominal hernia with 7 inch round Ventrio ST mesh with echo positioning system  PREOPERATIVE DIAGNOSIS:   Active Hospital Problems    Diagnosis Date Noted    Incisional hernia with obstruction but no gangrene [K43.0] 11/29/2023      POSTOPERATIVE DIAGNOSIS: Same  SURGEON:  Jennifer Salcedo MD   ANESTHESIA:  General endotracheal anesthesia and local  ANESTHESIA:  30  mlOF 0.5% Marcaine   ESTIMATED BLOOD LOSS:  10  ml  SPECIMEN: none  COMPLICATIONS:  None; patient tolerated the procedure well. DRAINS: none  DISPOSITION: Recovery Room  CONDITION: stable      Narrative: Indications: See history and physical examination. Patient with multiple prior abdominal surgeries. Patient had periumbilical pain and CT imaging which revealed an incarcerated hernia without obstruction. Patient presents today for robotic assisted laparoscopic repair of her hernia. Procedure: Patient was brought the operating suite placed supine on the operating table. She had pneumatic sequential compression devices on lower extremities. She is administered general anesthetic endotracheal intubation. She was given Ancef intravenously. After induction of general anesthesia patient's abdomen was prepped and draped in the usual sterile fashion. Timeout was performed. Skin nick was made at Albrecht's point varies needle was inserted and CO2 pneumoperitoneum was introduced followed by the camera. There were omental adhesions in the upper abdomen could see the inferior edge of an incarcerated hernia but there was a widely diastatic upper abdomen midline for the entire length below her lower midline incision.   3 ports were placed out laterally the upper port was

## 2023-11-30 VITALS
SYSTOLIC BLOOD PRESSURE: 111 MMHG | HEART RATE: 82 BPM | RESPIRATION RATE: 16 BRPM | OXYGEN SATURATION: 90 % | WEIGHT: 161.2 LBS | HEIGHT: 62 IN | BODY MASS INDEX: 29.66 KG/M2 | DIASTOLIC BLOOD PRESSURE: 55 MMHG | TEMPERATURE: 99.1 F

## 2023-11-30 LAB
GLUCOSE BLD STRIP.AUTO-MCNC: 100 MG/DL (ref 70–108)
GLUCOSE BLD STRIP.AUTO-MCNC: 96 MG/DL (ref 70–108)

## 2023-11-30 PROCEDURE — 6360000002 HC RX W HCPCS: Performed by: SURGERY

## 2023-11-30 PROCEDURE — 2580000003 HC RX 258: Performed by: SURGERY

## 2023-11-30 PROCEDURE — 6370000000 HC RX 637 (ALT 250 FOR IP): Performed by: SURGERY

## 2023-11-30 PROCEDURE — 82948 REAGENT STRIP/BLOOD GLUCOSE: CPT

## 2023-11-30 RX ORDER — ENOXAPARIN SODIUM 100 MG/ML
40 INJECTION SUBCUTANEOUS DAILY
Status: DISCONTINUED | OUTPATIENT
Start: 2023-11-30 | End: 2023-11-30 | Stop reason: HOSPADM

## 2023-11-30 RX ADMIN — SODIUM CHLORIDE: 9 INJECTION, SOLUTION INTRAVENOUS at 06:23

## 2023-11-30 RX ADMIN — ENOXAPARIN SODIUM 40 MG: 100 INJECTION SUBCUTANEOUS at 08:48

## 2023-11-30 RX ADMIN — TRAMADOL HYDROCHLORIDE 50 MG: 50 TABLET, COATED ORAL at 14:09

## 2023-11-30 RX ADMIN — ONDANSETRON 4 MG: 2 INJECTION INTRAMUSCULAR; INTRAVENOUS at 08:48

## 2023-11-30 RX ADMIN — BENZOCAINE 6 MG-MENTHOL 10 MG LOZENGES 1 LOZENGE: at 03:59

## 2023-11-30 RX ADMIN — TRAMADOL HYDROCHLORIDE 50 MG: 50 TABLET, COATED ORAL at 06:26

## 2023-11-30 ASSESSMENT — PAIN SCALES - GENERAL
PAINLEVEL_OUTOF10: 4
PAINLEVEL_OUTOF10: 5
PAINLEVEL_OUTOF10: 6
PAINLEVEL_OUTOF10: 5

## 2023-11-30 ASSESSMENT — PAIN DESCRIPTION - LOCATION
LOCATION: ABDOMEN
LOCATION: THROAT
LOCATION: ABDOMEN
LOCATION: ABDOMEN

## 2023-11-30 ASSESSMENT — PAIN DESCRIPTION - DESCRIPTORS
DESCRIPTORS: ACHING
DESCRIPTORS: SORE
DESCRIPTORS: SORE

## 2023-11-30 ASSESSMENT — PAIN - FUNCTIONAL ASSESSMENT: PAIN_FUNCTIONAL_ASSESSMENT: ACTIVITIES ARE NOT PREVENTED

## 2023-11-30 ASSESSMENT — PAIN DESCRIPTION - ORIENTATION: ORIENTATION: MID

## 2023-11-30 NOTE — PROGRESS NOTES
Patient discharged at this time. All IV's removed. Discharge instructions, medication changes and follow up appointments explained at this time. CHG given to patient and educated to clean incision sites. All questions answered at this time. AVS given to patient and paperwork signed with this RN. All patient belongings returned. Chart broken down and placed in yellow bin.

## 2023-11-30 NOTE — DISCHARGE SUMMARY
Discharge Summary     Patient Identification:  Aziza Hughes  : 1960  MRN: [id number]   Account: [de-identified]     Admit date: 2023  Discharge date: 2023    Attending provider: Lupe Degroot MD        Primary care provider: Jostin Diaz DO     Discharge Diagnoses:   Principal Problem:    Incisional hernia with obstruction but no gangrene  Active Problems:    Status post repair of ventral hernia  Resolved Problems:    * No resolved hospital problems. *  Postop pain     Hospital Course:   Aziza Hughes is a 59 y.o. female admitted to [address] [place] [address] on 2023 for surgical treatment of symptomatic incisional hernia. Micheal Almeida has a history of multiple previous abdominal operations. She had a prior upper midline incision at the time of surgery that entire upper midline was a hernia with a smaller hernia inferiorly. She had incarcerated omentum as well as small bowel. She underwent a robotic assisted laparoscopic repair of her 10 cm upper abdominal hernia with mesh. She was admitted overnight postop for pain control she is doing well this morning taking oral pain medications and ambulating and tolerating oral intake. She is hemodynamically stable. Discharge Medications:     Medication List        START taking these medications      traMADol 50 MG tablet  Commonly known as: Ultram  Take 1 tablet by mouth every 6 hours as needed for Pain for up to 7 days. Intended supply: 7 days.  Take lowest dose possible to manage pain Max Daily Amount: 200 mg            CONTINUE taking these medications      aspirin 81 MG chewable tablet     Biotin 01823 MCG Tabs     citalopram 40 MG tablet  Commonly known as: CELEXA  Take 1 tablet by mouth daily     cyanocobalamin 1000 MCG/ML injection  Inject 1 mL into the muscle every 30 days     ferrous fumarate-vitamin c 65-25 MG Tbcr CR tablet  Commonly known as: TERI-SEQUELS     furosemide 20 MG tablet  Commonly known as: LASIX  Take 1 tablet by mouth as

## 2023-12-06 ENCOUNTER — TELEPHONE (OUTPATIENT)
Dept: FAMILY MEDICINE CLINIC | Age: 63
End: 2023-12-06

## 2023-12-06 NOTE — TELEPHONE ENCOUNTER
I called Claire Quiñonez back and let her know that Dr. Tresa Monteiro advises her to increase her Gabapentin to 3 x daily, Claire Quiñonez stated understanding!

## 2023-12-06 NOTE — TELEPHONE ENCOUNTER
Make sure she is taking the muscle relaxant 3 times per day  Is she doing any pain meds, gabapentin, tramadol at this time?

## 2023-12-06 NOTE — TELEPHONE ENCOUNTER
Patient states she is having muscle spasms along the right side of her rib cage x 1 day. She just had a recent surgery and with her incision it is painful. She is scheduled to see you tomorrow. She does state that she is using heat on the area and she was off her muscle relaxer's prior to surgery but has resumed taking them since.    Please advise

## 2023-12-06 NOTE — TELEPHONE ENCOUNTER
I called and spoke to [place] know that Dr. Joe Encarnacion advises her to take a muscle relaxant 3 x daily. Micheal Almeida stated she is taking Gabapentin 300 MG 1 capsule 2 times daily, she does 1 pill in the morning and 1 pill at night.

## 2023-12-07 ENCOUNTER — OFFICE VISIT (OUTPATIENT)
Dept: FAMILY MEDICINE CLINIC | Age: 63
End: 2023-12-07
Payer: COMMERCIAL

## 2023-12-07 VITALS
OXYGEN SATURATION: 95 % | BODY MASS INDEX: 29.26 KG/M2 | HEART RATE: 76 BPM | SYSTOLIC BLOOD PRESSURE: 122 MMHG | HEIGHT: 62 IN | TEMPERATURE: 96.9 F | RESPIRATION RATE: 16 BRPM | WEIGHT: 159 LBS | DIASTOLIC BLOOD PRESSURE: 64 MMHG

## 2023-12-07 DIAGNOSIS — M62.838 MUSCLE SPASM: ICD-10-CM

## 2023-12-07 DIAGNOSIS — K21.9 LPRD (LARYNGOPHARYNGEAL REFLUX DISEASE): Primary | ICD-10-CM

## 2023-12-07 DIAGNOSIS — K43.0 INCISIONAL HERNIA WITH OBSTRUCTION BUT NO GANGRENE: ICD-10-CM

## 2023-12-07 PROCEDURE — 99214 OFFICE O/P EST MOD 30 MIN: CPT | Performed by: FAMILY MEDICINE

## 2023-12-07 RX ORDER — OFLOXACIN 3 MG/ML
SOLUTION AURICULAR (OTIC)
COMMUNITY
Start: 2023-11-23

## 2023-12-07 RX ORDER — OXYBUTYNIN CHLORIDE 5 MG/1
5 TABLET ORAL DAILY
COMMUNITY
Start: 2023-11-27

## 2023-12-07 ASSESSMENT — ENCOUNTER SYMPTOMS
SHORTNESS OF BREATH: 0
WHEEZING: 0
ABDOMINAL PAIN: 1
VOMITING: 0
NAUSEA: 1
DIARRHEA: 0
CONSTIPATION: 0

## 2023-12-07 NOTE — PROGRESS NOTES
Amari Rai (:  1960) is a 59 y.o. female,Established patient, here for evaluation of the following chief complaint(s):  Follow-Up from [address] [place] [address]:  HPI  2023 had upper abdominal hernia repair with mesh  Stayed the night---had some problems with low oxygen and higher sugar of 132 (had eaten some derek crackers and [employer])  Hearing test on Monday, not using ear drops currently  Has follow up on the  with surgeon  Planning to go to [place] on the   Only had to take tramadol once for pain  Has been using lift chair and walker when up and moving, slowly improving  Was getting spasms on R rib cage region, incisions are mainly on the left side of her abdomen  Does feel better with use of extra gabapentin yesterday, along with ES Tylenol    Review of Systems   Constitutional:  Negative for activity change, fatigue and unexpected weight change. Respiratory:  Negative for shortness of breath and wheezing. Cardiovascular:  Negative for chest pain, palpitations and leg swelling. Gastrointestinal:  Positive for abdominal pain (improving) and nausea (GERD has been worse). Negative for constipation, diarrhea and vomiting. Musculoskeletal:         Rib pain and muscle spasm are better with the higher dose of gabapentin and restarting the muscle relaxant   Neurological:  Negative for dizziness and headaches. Objective   Physical Exam  Constitutional:       Appearance: Normal appearance. Cardiovascular:      Rate and Rhythm: Normal rate and regular rhythm. Heart sounds: No murmur heard. No gallop. Pulmonary:      Effort: Pulmonary effort is normal.      Breath sounds: Normal breath sounds. No wheezing or rhonchi. Abdominal:      General: Abdomen is flat. Bowel sounds are normal. There is no distension. Palpations: Abdomen is soft. Tenderness: There is no abdominal tenderness. There is no guarding.    Musculoskeletal:      Right lower

## 2023-12-11 ENCOUNTER — PATIENT MESSAGE (OUTPATIENT)
Dept: FAMILY MEDICINE CLINIC | Age: 63
End: 2023-12-11

## 2023-12-11 NOTE — TELEPHONE ENCOUNTER
From: Woody Gutierrez  To: Dr. Cyndi Thornton: 12/11/2023 1:55 PM EST  Subject: Neurologist Referral    Meredith Vincent - Could you please send a new patient referral to Dr. Khushbu Ariza for Doreen Simpson. He is ready to switch to a Neurologist in [facility] and he did see Dr. Khushbu Ariza 16years old ago when he was first diagnosis for Parkinson.   Thank you  Magda Cole

## 2023-12-13 DIAGNOSIS — K43.2 INCISIONAL HERNIA, WITHOUT OBSTRUCTION OR GANGRENE: ICD-10-CM

## 2023-12-14 ENCOUNTER — OFFICE VISIT (OUTPATIENT)
Dept: SURGERY | Age: 63
End: 2023-12-14
Payer: COMMERCIAL

## 2023-12-14 VITALS
RESPIRATION RATE: 16 BRPM | HEIGHT: 62 IN | TEMPERATURE: 97.6 F | BODY MASS INDEX: 30.44 KG/M2 | DIASTOLIC BLOOD PRESSURE: 64 MMHG | OXYGEN SATURATION: 96 % | SYSTOLIC BLOOD PRESSURE: 122 MMHG | WEIGHT: 165.4 LBS | HEART RATE: 73 BPM

## 2023-12-14 DIAGNOSIS — K43.2 INCISIONAL HERNIA, WITHOUT OBSTRUCTION OR GANGRENE: Primary | ICD-10-CM

## 2023-12-14 DIAGNOSIS — M81.0 OSTEOPOROSIS, UNSPECIFIED OSTEOPOROSIS TYPE, UNSPECIFIED PATHOLOGICAL FRACTURE PRESENCE: ICD-10-CM

## 2023-12-14 DIAGNOSIS — Z48.89 ENCOUNTER FOR POSTOPERATIVE CARE: ICD-10-CM

## 2023-12-14 PROCEDURE — 99213 OFFICE O/P EST LOW 20 MIN: CPT | Performed by: SURGERY

## 2023-12-14 NOTE — PROGRESS NOTES
tablet, Take 1 tablet by mouth daily, Disp: , Rfl:     cyanocobalamin 1000 MCG/ML injection, Inject 1 mL into the muscle every 30 days, Disp: 3 mL, Rfl: 3    aspirin 81 MG chewable tablet, Take 1 tablet by mouth daily, Disp: , Rfl:     furosemide (LASIX) 20 MG tablet, Take 1 tablet by mouth as needed (swelling) Only takes when flying, Disp: 30 tablet, Rfl: 0    valACYclovir (VALTREX) 500 MG tablet, Take 1 tablet by mouth daily, Disp: 90 tablet, Rfl: 1    citalopram (CELEXA) 40 MG tablet, Take 1 tablet by mouth daily, Disp: 90 tablet, Rfl: 1    gabapentin (NEURONTIN) 300 MG capsule, TAKE 1 CAPSULE BY MOUTH EVERY MORNING AND 2 CAPSULES EVERY EVENING, Disp: , Rfl:     metFORMIN (GLUCOPHAGE-XR) 500 MG extended release tablet, Take 1 tablet by mouth daily, Disp: , Rfl:     tiZANidine (ZANAFLEX) 4 MG tablet, Take 1 tablet by mouth 2 times daily as needed, Disp: , Rfl:     triamcinolone (KENALOG) 0.1 % cream, triamcinolone acetonide 0.1 % topical cream  apply to the affected area twice daily, Disp: , Rfl:     Multiple Vitamin (MULTIVITAMIN ADULT PO), Take 2 tablets by mouth daily, Disp: , Rfl:     sucralfate (CARAFATE) 1 GM tablet, Take 1 tablet by mouth 3 times daily, Disp: , Rfl:     Biotin 45539 MCG TABS, Take 20,000 mcg by mouth daily, Disp: , Rfl:     ferrous fumarate-vitamin c (TERI-SEQUELS) 65-25 MG TBCR CR tablet, Take 1 tablet by mouth daily (with breakfast), Disp: , Rfl:     omeprazole (PRILOSEC) 40 MG delayed release capsule, Take 1 capsule by mouth every morning On empty stomach followed by a meal within 20 min (Patient taking differently: Take 1 capsule by mouth in the morning and at bedtime On empty stomach followed by a meal within 20 min), Disp: 90 capsule, Rfl: 3    Allergies  Allergies   Allergen Reactions    Vicodin [Hydrocodone-Acetaminophen] Nausea Only     Pupils dialated    Duricef [Cefadroxil] Nausea And Vomiting    Tape [Adhesive Tape] Rash       Review of Systems  History obtained from the

## 2023-12-15 ENCOUNTER — HOSPITAL ENCOUNTER (OUTPATIENT)
Dept: AUDIOLOGY | Age: 63
Discharge: HOME | End: 2023-12-15
Payer: COMMERCIAL

## 2023-12-15 PROCEDURE — 92557 COMPREHENSIVE HEARING TEST: CPT | Performed by: AUDIOLOGIST

## 2023-12-15 PROCEDURE — 92567 TYMPANOMETRY: CPT | Performed by: AUDIOLOGIST
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
## 2024-02-28 DIAGNOSIS — Z11.59 NEED FOR HEPATITIS C SCREENING TEST: ICD-10-CM

## 2024-02-28 DIAGNOSIS — Z13.220 SCREENING FOR LIPID DISORDERS: ICD-10-CM

## 2024-02-28 DIAGNOSIS — J01.00 ACUTE NON-RECURRENT MAXILLARY SINUSITIS: ICD-10-CM

## 2024-02-28 LAB
ALBUMIN SERPL-MCNC: 4 G/DL
ALP BLD-CCNC: 101 U/L
ALT SERPL-CCNC: 19 U/L
ANION GAP SERPL CALCULATED.3IONS-SCNC: 11 MMOL/L
ANTIBODY: NORMAL
AST SERPL-CCNC: 43 U/L
BASOPHILS ABSOLUTE: 0.04 /ΜL
BASOPHILS RELATIVE PERCENT: 0.8 %
BILIRUB SERPL-MCNC: 0.8 MG/DL (ref 0.1–1.4)
BUN BLDV-MCNC: 23 MG/DL
CALCIUM SERPL-MCNC: 8.7 MG/DL
CHLORIDE BLD-SCNC: 105 MMOL/L
CHOLESTEROL, TOTAL: 176 MG/DL
CHOLESTEROL/HDL RATIO: 3.7
CO2: 26 MMOL/L
CREAT SERPL-MCNC: 0.64 MG/DL
EGFR: 100
EOSINOPHILS ABSOLUTE: 0.17 /ΜL
EOSINOPHILS RELATIVE PERCENT: 3.3 %
GLUCOSE BLD-MCNC: 89 MG/DL
HCT VFR BLD CALC: 37.3 % (ref 36–46)
HDLC SERPL-MCNC: 48 MG/DL (ref 35–70)
HEMOGLOBIN: 12.3 G/DL (ref 12–16)
LDL CHOLESTEROL CALCULATED: 110 MG/DL (ref 0–160)
LYMPHOCYTES ABSOLUTE: 1.04 /ΜL
LYMPHOCYTES RELATIVE PERCENT: 19.9 %
MCH RBC QN AUTO: 31.9 PG
MCHC RBC AUTO-ENTMCNC: 33 G/DL
MCV RBC AUTO: 96.9 FL
MONOCYTES ABSOLUTE: 0.49 /ΜL
MONOCYTES RELATIVE PERCENT: 9.4 %
NEUTROPHILS ABSOLUTE: 3.46 /ΜL
NEUTROPHILS RELATIVE PERCENT: 66.2 %
NONHDLC SERPL-MCNC: NORMAL MG/DL
PDW BLD-RTO: 13.8 %
PLATELET # BLD: 230 K/ΜL
PMV BLD AUTO: 10.4 FL
POTASSIUM SERPL-SCNC: 4.6 MMOL/L
RBC # BLD: 3.85 10^6/ΜL
SODIUM BLD-SCNC: 142 MMOL/L
TOTAL PROTEIN: 6.4
TRIGL SERPL-MCNC: 88 MG/DL
VLDLC SERPL CALC-MCNC: 18 MG/DL
WBC # BLD: 5.2 10^3/ML

## 2024-03-05 ENCOUNTER — OFFICE VISIT (OUTPATIENT)
Dept: FAMILY MEDICINE CLINIC | Age: 64
End: 2024-03-05
Payer: COMMERCIAL

## 2024-03-05 VITALS
BODY MASS INDEX: 29.37 KG/M2 | TEMPERATURE: 97.1 F | HEIGHT: 62 IN | OXYGEN SATURATION: 95 % | WEIGHT: 159.6 LBS | HEART RATE: 94 BPM | RESPIRATION RATE: 16 BRPM | SYSTOLIC BLOOD PRESSURE: 138 MMHG | DIASTOLIC BLOOD PRESSURE: 78 MMHG

## 2024-03-05 DIAGNOSIS — R41.3 MEMORY CHANGES: ICD-10-CM

## 2024-03-05 DIAGNOSIS — Z00.00 ENCOUNTER FOR WELL ADULT EXAM WITHOUT ABNORMAL FINDINGS: Primary | ICD-10-CM

## 2024-03-05 DIAGNOSIS — F41.9 ANXIETY: ICD-10-CM

## 2024-03-05 DIAGNOSIS — M62.838 MUSCLE SPASM: ICD-10-CM

## 2024-03-05 PROCEDURE — 99396 PREV VISIT EST AGE 40-64: CPT | Performed by: FAMILY MEDICINE

## 2024-03-05 RX ORDER — CHOLESTYRAMINE 4 G/9G
1 POWDER, FOR SUSPENSION ORAL 2 TIMES DAILY
COMMUNITY
Start: 2024-02-26

## 2024-03-05 RX ORDER — CITALOPRAM 40 MG/1
40 TABLET ORAL DAILY
Qty: 90 TABLET | Refills: 1 | Status: SHIPPED | OUTPATIENT
Start: 2024-03-05

## 2024-03-05 RX ORDER — TIZANIDINE 4 MG/1
4 TABLET ORAL 2 TIMES DAILY PRN
Qty: 60 TABLET | Refills: 2 | Status: SHIPPED | OUTPATIENT
Start: 2024-03-05

## 2024-03-05 RX ORDER — MELOXICAM 15 MG/1
15 TABLET ORAL DAILY
COMMUNITY
Start: 2024-02-26

## 2024-03-05 RX ORDER — MONTELUKAST SODIUM 4 MG/1
1 TABLET, CHEWABLE ORAL 2 TIMES DAILY
COMMUNITY
Start: 2024-03-04

## 2024-03-05 ASSESSMENT — PATIENT HEALTH QUESTIONNAIRE - PHQ9
SUM OF ALL RESPONSES TO PHQ QUESTIONS 1-9: 0
SUM OF ALL RESPONSES TO PHQ9 QUESTIONS 1 & 2: 0
SUM OF ALL RESPONSES TO PHQ QUESTIONS 1-9: 0
1. LITTLE INTEREST OR PLEASURE IN DOING THINGS: 0
SUM OF ALL RESPONSES TO PHQ QUESTIONS 1-9: 0
SUM OF ALL RESPONSES TO PHQ QUESTIONS 1-9: 0
2. FEELING DOWN, DEPRESSED OR HOPELESS: 0

## 2024-03-05 ASSESSMENT — ENCOUNTER SYMPTOMS
SORE THROAT: 0
NAUSEA: 0
VOMITING: 0
DIARRHEA: 0
SHORTNESS OF BREATH: 0
CONSTIPATION: 0
RHINORRHEA: 0
SINUS PRESSURE: 0
COUGH: 0
ABDOMINAL PAIN: 0
WHEEZING: 0

## 2024-03-05 NOTE — PROGRESS NOTES
Not likely to do better with changing Celexa since her symptoms were better when she was with her family in [place].  Discussed making time for herself, that she is dealing with caregiver stress and needs to have some outlets to get away for a few hours.    Return in about 6 months (around 9/5/2024).               An electronic signature was used to authenticate this note.    --Alexandra Chamorro, DO

## 2024-04-03 PROBLEM — Z86.0100 HISTORY OF COLONIC POLYPS: Status: ACTIVE | Noted: 2024-04-03

## 2024-04-03 PROBLEM — Z86.010 HISTORY OF COLONIC POLYPS: Status: ACTIVE | Noted: 2024-04-03

## 2024-04-04 ENCOUNTER — PATIENT MESSAGE (OUTPATIENT)
Dept: FAMILY MEDICINE CLINIC | Age: 64
End: 2024-04-04

## 2024-04-04 ENCOUNTER — OFFICE VISIT (OUTPATIENT)
Dept: SURGERY | Age: 64
End: 2024-04-04
Payer: COMMERCIAL

## 2024-04-04 VITALS
BODY MASS INDEX: 30.01 KG/M2 | OXYGEN SATURATION: 97 % | HEART RATE: 80 BPM | HEIGHT: 62 IN | WEIGHT: 163.1 LBS | TEMPERATURE: 97.6 F | DIASTOLIC BLOOD PRESSURE: 64 MMHG | RESPIRATION RATE: 18 BRPM | SYSTOLIC BLOOD PRESSURE: 110 MMHG

## 2024-04-04 DIAGNOSIS — K43.2 INCISIONAL HERNIA, WITHOUT OBSTRUCTION OR GANGRENE: ICD-10-CM

## 2024-04-04 DIAGNOSIS — Z98.890 STATUS POST REPAIR OF VENTRAL HERNIA: ICD-10-CM

## 2024-04-04 DIAGNOSIS — R10.13 EPIGASTRIC PAIN: Primary | ICD-10-CM

## 2024-04-04 DIAGNOSIS — Z87.19 STATUS POST REPAIR OF VENTRAL HERNIA: ICD-10-CM

## 2024-04-04 PROCEDURE — 99213 OFFICE O/P EST LOW 20 MIN: CPT | Performed by: SURGERY

## 2024-04-04 RX ORDER — HYDROXYZINE HYDROCHLORIDE 25 MG/1
25 TABLET, FILM COATED ORAL NIGHTLY PRN
Qty: 30 TABLET | Refills: 1 | Status: SHIPPED | OUTPATIENT
Start: 2024-04-04

## 2024-04-04 NOTE — TELEPHONE ENCOUNTER
From: Jane Cottrell  To: Dr. Alexandra Chamorro  Sent: 4/4/2024 1:00 PM EDT  Subject: Appointment Request    Appointment Request From: Jane Cottrell    With Provider: Alexandra Chamorro DO [[facility]]    Preferred Date Range: 4/5/2024 – 4/5/2024    Preferred Times: Friday Morning    Reason for visit: Request an Appointment    Comments:  Stress, unable to sleep, anxiety and high blood pressure.

## 2024-04-04 NOTE — TELEPHONE ENCOUNTER
From: Jane Cottrell  To: Dr. Alexandra Chamorro  Sent: 4/4/2024 12:33 PM EDT  Subject: stress level    Alexandra - my stress level is getting worse and sometimes my heart rate is over 100.  been dealing with 2023 insurance claims that hasn't been paid so anthem phone calls are frustrating.  been to doctor appts with since Monday  colonoscopy for Justin Carey on Tuesday with back surgeon and then we had to turn around and be there Wednesday at 9:30.  So I had to drive 9 hours on Tuesday.  I just feel so overwhelmed which is making it difficult to sleep, relax and causing tension headaches.

## 2024-04-04 NOTE — TELEPHONE ENCOUNTER
I would recommend making an appointment to talk about treatment options since it doesn't sound like the citalopram is working well enough at this time  I could send in some hydroxyzine to help with your sleep to get you through the weekend

## 2024-04-04 NOTE — TELEPHONE ENCOUNTER
I called Jane and let her know that Dr. Chamorro was not in the office on 4/5/2024 and that the office would be closed on Monday 4/8/2024. Jane stated she would rather see Dr. Chamorro and scheduled for 4/11/2024.

## 2024-04-05 ASSESSMENT — ENCOUNTER SYMPTOMS
ABDOMINAL PAIN: 1
VOICE CHANGE: 0
VOMITING: 0
BLOOD IN STOOL: 0
ABDOMINAL DISTENTION: 0
COLOR CHANGE: 0
SORE THROAT: 0
NAUSEA: 0
DIARRHEA: 0
COUGH: 0
SHORTNESS OF BREATH: 0

## 2024-04-05 NOTE — PROGRESS NOTES
Maddy Ordaz MD   General Surgery  Follow up Patient Evaluation in Office  Pt Name: Jane Cottrell  Date of Birth 1960   Today's Date: 4/4/2024  Medical Record Number: [id number]  Referring Provider: No ref. provider found  Primary Care Provider: Alexandra Chamorro DO  Chief Complaint:  Chief Complaint   Patient presents with    Follow-up     S/P-- Robotic assisted laparoscopic repair of incarcerated 10 cm upper abdominal hernia with 7 inch round Ventrio ST mesh with echo positioning system--11/29/23-Last seen 12/14/23-Tenderness along hernia repair       ASSESSMENT      1. Epigastric pain    2. Incisional hernia, without obstruction or gangrene    3. Status post repair of ventral hernia         PLANS      Patient complains of some epigastric pain with bending subxiphoid.  Hernia repair feels intact.  Patient reassured.  She will monitor symptoms.  Imaging declined at this point.  3.  Follow-up 1 month for recheck   4.  Patient instructed to call in interval with any worsening symptoms    SUBJECTIVE     Jane is a 63 y.o. year old female who is presenting today in the office for follow-up evaluation of abdominal pain.  She underwent a laparoscopic repair of incarcerated incisional hernia follow-up 2023.  She complains of more recent onset of epigastric pain.  She has had a prior cholecystectomy.  She states pain more with bending.  No change in GI function.  No other symptoms.  On examination hernia repair feels intact.  May have a small seroma.  Discussed imaging she wants to wait and evaluate symptoms.      Past Medical History  Past Medical History:   Diagnosis Date    Anxiety     Arthritis     Cancer (HCC)     breast 2002 left    Difficult intubation     \"previously had vocal cord damage, used smaller tube (pedi) and has worked fine\"    Fracture of lumbar spine (HCC)     Hernia, hiatal     Nausea & vomiting     scopalomine patch works well    Osteoarthritis     PONV (postoperative nausea and vomiting)

## 2024-04-11 ENCOUNTER — OFFICE VISIT (OUTPATIENT)
Dept: FAMILY MEDICINE CLINIC | Age: 64
End: 2024-04-11
Payer: COMMERCIAL

## 2024-04-11 VITALS
RESPIRATION RATE: 16 BRPM | HEART RATE: 78 BPM | OXYGEN SATURATION: 96 % | WEIGHT: 161.4 LBS | TEMPERATURE: 97.3 F | BODY MASS INDEX: 29.7 KG/M2 | DIASTOLIC BLOOD PRESSURE: 72 MMHG | SYSTOLIC BLOOD PRESSURE: 122 MMHG | HEIGHT: 62 IN

## 2024-04-11 DIAGNOSIS — F41.9 ANXIETY: Primary | ICD-10-CM

## 2024-04-11 DIAGNOSIS — R03.0 ELEVATED BLOOD PRESSURE READING: ICD-10-CM

## 2024-04-11 PROCEDURE — 99213 OFFICE O/P EST LOW 20 MIN: CPT | Performed by: FAMILY MEDICINE

## 2024-04-11 RX ORDER — HYDROXYZINE HYDROCHLORIDE 25 MG/1
25 TABLET, FILM COATED ORAL 2 TIMES DAILY PRN
Qty: 60 TABLET | Refills: 2 | Status: SHIPPED | OUTPATIENT
Start: 2024-04-11

## 2024-04-11 RX ORDER — VONOPRAZAN FUMARATE 26.72 MG/1
20 TABLET ORAL DAILY
COMMUNITY

## 2024-04-11 RX ORDER — SODIUM, POTASSIUM,MAG SULFATES 17.5-3.13G
SOLUTION, RECONSTITUTED, ORAL ORAL
COMMUNITY
Start: 2024-03-06

## 2024-04-11 RX ORDER — ESCITALOPRAM OXALATE 20 MG/1
20 TABLET ORAL DAILY
Qty: 30 TABLET | Refills: 5 | Status: SHIPPED | OUTPATIENT
Start: 2024-04-11

## 2024-04-11 ASSESSMENT — ENCOUNTER SYMPTOMS
COUGH: 0
NAUSEA: 0
WHEEZING: 0
CONSTIPATION: 0
VOMITING: 0
ABDOMINAL PAIN: 0
SHORTNESS OF BREATH: 0
DIARRHEA: 0

## 2024-04-11 NOTE — PROGRESS NOTES
lower leg: No edema.      Left lower leg: No edema.   Skin:     General: Skin is warm.   Neurological:      Mental Status: She is alert.   Psychiatric:         Mood and Affect: Mood normal.               ASSESSMENT/PLAN:  1. Anxiety  -     hydrOXYzine HCl (ATARAX) 25 MG tablet; Take 1 tablet by mouth 2 times daily as needed for Anxiety, Disp-60 tablet, R-2Normal  -     escitalopram (LEXAPRO) 20 MG tablet; Take 1 tablet by mouth daily, Disp-30 tablet, R-5Normal  2. Elevated blood pressure reading  -     metoprolol tartrate (LOPRESSOR) 25 MG tablet; Take 0.5 tablets by mouth 2 times daily as needed (elevated blood pressures), Disp-30 tablet, R-0Normal      Return in about 6 months (around 10/11/2024).               An electronic signature was used to authenticate this note.    --Alexandra Chamorro, DO

## 2024-05-13 ENCOUNTER — PATIENT MESSAGE (OUTPATIENT)
Dept: FAMILY MEDICINE CLINIC | Age: 64
End: 2024-05-13

## 2024-05-13 NOTE — TELEPHONE ENCOUNTER
From: Jane Cottrell  To: Dr. Alexandra Chamorro  Sent: 5/13/2024 3:41 PM EDT  Subject: Generic Lexapro     The Lexapro just isn't working- I feel more agitated, frustrated and stressed out with Justin.  He had his back surgery today and I wasn't very patient with him when they brought him to his room.  What can I do to help with the Lexapro?  Thanks  Jane

## 2024-05-14 RX ORDER — BUSPIRONE HYDROCHLORIDE 10 MG/1
10 TABLET ORAL 2 TIMES DAILY
Qty: 60 TABLET | Refills: 0 | Status: SHIPPED | OUTPATIENT
Start: 2024-05-14 | End: 2024-06-13

## 2024-05-21 ENCOUNTER — OFFICE VISIT (OUTPATIENT)
Dept: FAMILY MEDICINE CLINIC | Age: 64
End: 2024-05-21
Payer: COMMERCIAL

## 2024-05-21 VITALS
SYSTOLIC BLOOD PRESSURE: 122 MMHG | HEART RATE: 88 BPM | TEMPERATURE: 97.5 F | DIASTOLIC BLOOD PRESSURE: 70 MMHG | OXYGEN SATURATION: 95 % | WEIGHT: 161.4 LBS | HEIGHT: 62 IN | BODY MASS INDEX: 29.7 KG/M2 | RESPIRATION RATE: 16 BRPM

## 2024-05-21 DIAGNOSIS — M99.02 SEGMENTAL DYSFUNCTION OF THORACIC REGION: ICD-10-CM

## 2024-05-21 DIAGNOSIS — M99.08 SEGMENTAL AND SOMATIC DYSFUNCTION OF RIB CAGE: Primary | ICD-10-CM

## 2024-05-21 DIAGNOSIS — M99.01 CERVICAL (NECK) REGION SOMATIC DYSFUNCTION: ICD-10-CM

## 2024-05-21 PROBLEM — M51.369 LUMBAR DEGENERATIVE DISC DISEASE: Status: ACTIVE | Noted: 2024-05-21

## 2024-05-21 PROBLEM — R76.8 ANA POSITIVE: Status: ACTIVE | Noted: 2024-05-21

## 2024-05-21 PROBLEM — M50.30 DEGENERATIVE CERVICAL DISC: Status: ACTIVE | Noted: 2024-05-21

## 2024-05-21 PROBLEM — M51.36 LUMBAR DEGENERATIVE DISC DISEASE: Status: ACTIVE | Noted: 2024-05-21

## 2024-05-21 PROBLEM — N20.0 NEPHROLITHIASIS: Status: ACTIVE | Noted: 2024-05-21

## 2024-05-21 PROBLEM — R76.8 ELEVATED RHEUMATOID FACTOR: Status: ACTIVE | Noted: 2024-05-21

## 2024-05-21 PROCEDURE — 98926 OSTEOPATH MANJ 3-4 REGIONS: CPT | Performed by: FAMILY MEDICINE

## 2024-05-21 RX ORDER — TRIAMCINOLONE ACETONIDE 1 MG/G
CREAM TOPICAL
COMMUNITY
Start: 2023-05-24

## 2024-05-21 ASSESSMENT — ENCOUNTER SYMPTOMS
WHEEZING: 0
COUGH: 0
CONSTIPATION: 0
NAUSEA: 0
ABDOMINAL PAIN: 0
VOMITING: 0
DIARRHEA: 0
SHORTNESS OF BREATH: 0

## 2024-05-21 NOTE — PROGRESS NOTES
Jane Cottrell (:  1960) is a 63 y.o. female,Established patient, here for evaluation of the following chief complaint(s):  Dizziness      Subjective   SUBJECTIVE/OBJECTIVE:  HPI  Pain located L side of neck and upper back  Pain started after flying back on the 10th, then  had surgery at [facility]  Treatment:  OMT has helped in the past  Worsens: with fast motion  Improves: OMT has helped    Review of Systems   Constitutional:  Positive for fatigue (didn't sleep well for the last few weeks). Negative for activity change and unexpected weight change.   Respiratory:  Negative for cough, shortness of breath and wheezing.    Cardiovascular:  Negative for chest pain, palpitations and leg swelling.   Gastrointestinal:  Negative for abdominal pain, constipation, diarrhea, nausea and vomiting.   Neurological:  Positive for dizziness. Negative for light-headedness and headaches.          Objective   Physical Exam  Constitutional:       Appearance: Normal appearance.   Cardiovascular:      Rate and Rhythm: Normal rate and regular rhythm.      Heart sounds: No murmur heard.     No gallop.   Pulmonary:      Effort: Pulmonary effort is normal.      Breath sounds: Normal breath sounds. No wheezing, rhonchi or rales.   Abdominal:      General: Abdomen is flat. Bowel sounds are normal. There is no distension.      Palpations: Abdomen is soft.      Tenderness: There is no abdominal tenderness. There is no guarding.   Musculoskeletal:      Right lower leg: No edema.      Left lower leg: No edema.      Comments: MS:  Spasm upper thoracic on the left, [id number], posterior 2nd to 4th ribs on the left, [id number], AA rotated left   Skin:     General: Skin is warm.   Neurological:      Mental Status: She is alert.   Psychiatric:         Mood and Affect: Mood normal.            Assessment & Plan   ASSESSMENT/PLAN:  1. Segmental and somatic dysfunction of rib cage  2. Segmental dysfunction of thoracic region  3. Cervical

## 2024-05-23 ENCOUNTER — PATIENT MESSAGE (OUTPATIENT)
Dept: FAMILY MEDICINE CLINIC | Age: 64
End: 2024-05-23

## 2024-05-24 RX ORDER — MECLIZINE HYDROCHLORIDE 25 MG/1
25 TABLET ORAL 3 TIMES DAILY PRN
Qty: 30 TABLET | Refills: 0 | Status: SHIPPED | OUTPATIENT
Start: 2024-05-24 | End: 2024-06-03

## 2024-05-24 NOTE — TELEPHONE ENCOUNTER
From: Jane Cottrell  To: Dr. Alexandra Chamorro  Sent: 5/23/2024 6:35 PM EDT  Subject: Medicine HCl 25mg     Alexandra mac I please get a refill on the Meclizine HCl 25mg.  Last filled on 11/6/2023.  Still having dizziness.  Thank you

## 2024-06-27 ENCOUNTER — OFFICE VISIT (OUTPATIENT)
Dept: SURGERY | Age: 64
End: 2024-06-27
Payer: COMMERCIAL

## 2024-06-27 VITALS
DIASTOLIC BLOOD PRESSURE: 64 MMHG | RESPIRATION RATE: 16 BRPM | HEART RATE: 67 BPM | WEIGHT: 165.1 LBS | TEMPERATURE: 97.2 F | SYSTOLIC BLOOD PRESSURE: 122 MMHG | HEIGHT: 62 IN | BODY MASS INDEX: 30.38 KG/M2 | OXYGEN SATURATION: 90 %

## 2024-06-27 DIAGNOSIS — Z98.890 HISTORY OF NISSEN FUNDOPLICATION: ICD-10-CM

## 2024-06-27 DIAGNOSIS — R10.13 EPIGASTRIC PAIN: Primary | ICD-10-CM

## 2024-06-27 DIAGNOSIS — K43.2 INCISIONAL HERNIA, WITHOUT OBSTRUCTION OR GANGRENE: ICD-10-CM

## 2024-06-27 PROCEDURE — 99214 OFFICE O/P EST MOD 30 MIN: CPT | Performed by: SURGERY

## 2024-06-28 ASSESSMENT — ENCOUNTER SYMPTOMS
ABDOMINAL DISTENTION: 0
BLOOD IN STOOL: 0
WHEEZING: 0
ABDOMINAL PAIN: 1
COUGH: 0
DIARRHEA: 0
COLOR CHANGE: 0
VOMITING: 0
SHORTNESS OF BREATH: 0
NAUSEA: 0
VOICE CHANGE: 0
SORE THROAT: 0

## 2024-06-28 NOTE — PROGRESS NOTES
Maddy Ordaz MD   General Surgery  Follow up Patient Evaluation in Office  Pt Name: Jane Cottrell  Date of Birth 1960   Today's Date: 6/27/2024  Medical Record Number: [id number]  Referring Provider: No ref. provider found  Primary Care Provider: Alexandra Chamorro DO  Chief Complaint:  Chief Complaint   Patient presents with    Surgical Consult     Area getting larger and very tender, Seroma in past.  EGD completed       ASSESSMENT      1. Epigastric pain    2. Incisional hernia, without obstruction or gangrene    3. History of Nissen fundoplication    Per Dr. Valle     PLANS   Assessment & Plan   Patient complains of some epigastric pain with bending subxiphoid.  More pain with bending over.  Seroma versus recurrence of hernia  Recent EGD esophagitis despite prior Nissen fundoplication.  Performed previously by Dr. Valle.  3.  Needs CT scan of the chest abdomen and pelvis to evaluate prior Nissen fundoplication as well as evaluate for upper abdominal seroma versus hematoma  4.  Patient instructed to call in interval with any worsening symptoms  5.  Follow-up after additional imaging.  SUBJECTIVE     Jane is a 64 y.o. year old female who is presenting today in the office for follow-up evaluation of upper abdominal pain which she reports is worsening with bending over..  She underwent a laparoscopic repair of incarcerated incisional hernia follow-up 2023.  She complains of more recent onset of epigastric pain.  She has had a prior cholecystectomy.  She states pain more with bending.  No change in GI function.  No other symptoms.  On examination hernia repair feels intact.  May have a small seroma.  Discussed imaging she wants to wait and evaluate symptoms.  Prior laparoscopic cholecystectomy.  And previous Nissen fundoplication by Dr. Valle.  She did have a recent EGD with Dr. Puga which despite fundoplication still has esophagitis.  She is taking Voquezna.  Denies any change in bowel habits.  No

## 2024-07-05 ENCOUNTER — HOSPITAL ENCOUNTER (OUTPATIENT)
Dept: CT IMAGING | Age: 64
Discharge: HOME | End: 2024-07-05
Attending: SURGERY
Payer: COMMERCIAL

## 2024-07-05 DIAGNOSIS — K43.2 INCISIONAL HERNIA, WITHOUT OBSTRUCTION OR GANGRENE: ICD-10-CM

## 2024-07-05 LAB
CREAT BLD-MCNC: 0.6 MG/DL (ref 0.5–1.2)
GFR SERPL CREATININE-BSD FRML MDRD: > 90 ML/MIN/1.73M2

## 2024-07-05 PROCEDURE — 74177 CT ABD & PELVIS W/CONTRAST: CPT

## 2024-07-05 PROCEDURE — 82565 ASSAY OF CREATININE: CPT

## 2024-07-05 PROCEDURE — 6360000004 HC RX CONTRAST MEDICATION: Performed by: SURGERY

## 2024-07-05 RX ADMIN — IOPAMIDOL 85 ML: 755 INJECTION, SOLUTION INTRAVENOUS at 10:41

## 2024-07-10 ENCOUNTER — OFFICE VISIT (OUTPATIENT)
Dept: FAMILY MEDICINE CLINIC | Age: 64
End: 2024-07-10
Payer: COMMERCIAL

## 2024-07-10 VITALS
TEMPERATURE: 97.2 F | OXYGEN SATURATION: 97 % | RESPIRATION RATE: 16 BRPM | BODY MASS INDEX: 30.66 KG/M2 | DIASTOLIC BLOOD PRESSURE: 84 MMHG | HEIGHT: 62 IN | SYSTOLIC BLOOD PRESSURE: 124 MMHG | HEART RATE: 73 BPM | WEIGHT: 166.6 LBS

## 2024-07-10 DIAGNOSIS — F41.9 ANXIETY: ICD-10-CM

## 2024-07-10 DIAGNOSIS — R42 VERTIGO: ICD-10-CM

## 2024-07-10 DIAGNOSIS — E66.1 CLASS 1 DRUG-INDUCED OBESITY WITH SERIOUS COMORBIDITY AND BODY MASS INDEX (BMI) OF 30.0 TO 30.9 IN ADULT: ICD-10-CM

## 2024-07-10 DIAGNOSIS — J98.11 ATELECTASIS: ICD-10-CM

## 2024-07-10 DIAGNOSIS — R03.0 ELEVATED BLOOD PRESSURE READING: Primary | ICD-10-CM

## 2024-07-10 PROCEDURE — 99214 OFFICE O/P EST MOD 30 MIN: CPT | Performed by: FAMILY MEDICINE

## 2024-07-10 RX ORDER — BUSPIRONE HYDROCHLORIDE 10 MG/1
10 TABLET ORAL 2 TIMES DAILY
COMMUNITY
End: 2024-07-10 | Stop reason: SDUPTHER

## 2024-07-10 RX ORDER — BUSPIRONE HYDROCHLORIDE 10 MG/1
10 TABLET ORAL 2 TIMES DAILY
Qty: 180 TABLET | Refills: 3 | Status: SHIPPED | OUTPATIENT
Start: 2024-07-10

## 2024-07-10 RX ORDER — TIRZEPATIDE 2.5 MG/.5ML
2.5 INJECTION, SOLUTION SUBCUTANEOUS WEEKLY
Qty: 2 ML | Refills: 1 | Status: SHIPPED | OUTPATIENT
Start: 2024-07-10

## 2024-07-10 RX ORDER — MECLIZINE HYDROCHLORIDE 25 MG/1
25 TABLET ORAL 3 TIMES DAILY PRN
Qty: 30 TABLET | Refills: 5 | Status: SHIPPED | OUTPATIENT
Start: 2024-07-10

## 2024-07-10 ASSESSMENT — ENCOUNTER SYMPTOMS
WHEEZING: 0
SHORTNESS OF BREATH: 1
VOMITING: 0
NAUSEA: 0
CONSTIPATION: 0
DIARRHEA: 0
COUGH: 0
CHEST TIGHTNESS: 0

## 2024-07-10 NOTE — PROGRESS NOTES
Jane Cottrell (:  1960) is a 64 y.o. female,Established patient, here for evaluation of the following chief complaint(s):  Shortness of Breath, Weight Management, and Other (Discuss testing)      Subjective   SUBJECTIVE/OBJECTIVE:  HPI    CT 2024 fat containing ventral hernia 15 x 30 mm, diverticulosis, hiatal hernia, bilateral nephrolithiasis, bilateral atelectasis  SOB when she first wakes up, when doing activity, mainly if bent forward  Weight is nearly up to 170 lbs---not eating as well, not cooking as much (eating out more, more processsed foods), needs to lose some weight for probable upcoming surgery  Not exercising as much, did strain to open barn door (just had to get barn ), straining from mid March until last week  Active---often gets 20,000 steps, hasn't started swimming yet    Review of Systems   Constitutional:  Positive for fatigue and unexpected weight change (difficulty losing weight). Negative for activity change, chills and fever.   Respiratory:  Positive for shortness of breath (with activity). Negative for cough, chest tightness and wheezing.    Cardiovascular:  Negative for chest pain, palpitations and leg swelling.   Gastrointestinal:  Negative for constipation, diarrhea, nausea and vomiting.   Musculoskeletal:  Negative for arthralgias.   Skin:  Negative for rash.   Neurological:  Positive for dizziness (off and on, meclizine helps). Negative for light-headedness and headaches.   Hematological:  Negative for adenopathy.   Psychiatric/Behavioral:  The patient is nervous/anxious (doing much better with buspirone).           Objective   Physical Exam  Constitutional:       General: She is not in acute distress.     Appearance: Normal appearance. She is normal weight. She is not ill-appearing.   HENT:      Head: Normocephalic and atraumatic.      Nose: Mucosal edema, congestion and rhinorrhea present. Rhinorrhea is purulent.      Right Turbinates: Swollen.      Left

## 2024-07-15 ENCOUNTER — OFFICE VISIT (OUTPATIENT)
Dept: SURGERY | Age: 64
End: 2024-07-15
Payer: COMMERCIAL

## 2024-07-15 VITALS
TEMPERATURE: 97.8 F | RESPIRATION RATE: 18 BRPM | SYSTOLIC BLOOD PRESSURE: 132 MMHG | OXYGEN SATURATION: 98 % | DIASTOLIC BLOOD PRESSURE: 70 MMHG | HEART RATE: 97 BPM

## 2024-07-15 DIAGNOSIS — R10.13 EPIGASTRIC PAIN: ICD-10-CM

## 2024-07-15 DIAGNOSIS — Z98.890 HISTORY OF NISSEN FUNDOPLICATION: ICD-10-CM

## 2024-07-15 DIAGNOSIS — K43.2 RECURRENT VENTRAL HERNIA: Primary | ICD-10-CM

## 2024-07-15 DIAGNOSIS — M81.0 OSTEOPOROSIS, UNSPECIFIED OSTEOPOROSIS TYPE, UNSPECIFIED PATHOLOGICAL FRACTURE PRESENCE: ICD-10-CM

## 2024-07-15 PROCEDURE — 99214 OFFICE O/P EST MOD 30 MIN: CPT | Performed by: SURGERY

## 2024-07-15 ASSESSMENT — ENCOUNTER SYMPTOMS
COLOR CHANGE: 0
NAUSEA: 0
VOICE CHANGE: 0
VOMITING: 0
COUGH: 0
SORE THROAT: 0
DIARRHEA: 0
WHEEZING: 0
BLOOD IN STOOL: 0
ABDOMINAL PAIN: 1

## 2024-07-15 NOTE — PROGRESS NOTES
Behavior normal.         Lab Results   Component Value Date    WBC 5.2 02/27/2024    HGB 12.3 02/27/2024    HCT 37.3 02/27/2024     02/27/2024    CHOL 176 02/27/2024    TRIG 88 02/27/2024    HDL 48 02/27/2024    ALT 19 02/27/2024    AST 43 02/27/2024     02/27/2024    K 4.6 02/27/2024     02/27/2024    CREATININE 0.6 07/05/2024    BUN 23 02/27/2024    CO2 26 02/27/2024    TSH 1.330 02/25/2017       MEN PELVIS W IV CONTRAST Additional Contrast? None  CT ABDOMEN PELVIS W IV CONTRAST Additional Contrast? None  Order: [id number]  Status: Final result       Visible to patient: Yes (seen)       Next appt: Today at 10:30 AM in General Surgery (Maddy Ordaz MD)       Dx: Incisional hernia, without obstructio...    0 Result Notes  Details    Reading Physician Reading Date Result Priority   Gayatri Kellogg MD  [phone] 7/5/2024      Narrative & Impression  PROCEDURE: CT ABDOMEN PELVIS W IV CONTRAST     CLINICAL INFORMATION: Incisional hernia without obstruction or gangrene     COMPARISON: CT abdomen and pelvis 11/7/2023.     TECHNIQUE: Axial 5 mm CT images were obtained through the abdomen and pelvis  after the administration of 85 cc Isovue 370 intravenous contrast. Coronal and  sagittal reconstructions were obtained.     All CT scans at this facility use dose modulation, iterative reconstruction,  and/or weight-based dosing when appropriate to reduce radiation dose to as low  as reasonably achievable.     FINDINGS:  Lung bases: Dependent atelectasis is visualized at the lung bases.     Liver/gallbladder/bilary tree: The gallbladder is absent. No biliary ductal  dilatation is identified. Hepatomegaly and hepatic steatosis are observed.     Pancreas: Normal.  Spleen : Normal.  Adrenal glands: Normal.     Kidneys/ ureters/ bladder: Punctate bilateral nonobstructing renal calculi are  stable. No hydronephrosis or hydroureter is observed. The urinary bladder is  partially distended and grossly

## 2024-07-17 ASSESSMENT — ENCOUNTER SYMPTOMS
SHORTNESS OF BREATH: 1
ABDOMINAL DISTENTION: 1

## 2024-08-16 ENCOUNTER — PATIENT MESSAGE (OUTPATIENT)
Dept: FAMILY MEDICINE CLINIC | Age: 64
End: 2024-08-16

## 2024-08-19 NOTE — TELEPHONE ENCOUNTER
Since he had a fever on Tuesday, you have already been exposed at this point.  If your immunizations are updated then lower chance of getting CoVid.  I would recommend wearing masks when in the same room, although we believe that after 5 days of symptoms (which would have been Sunday) then past the contagious stage.  Push fluids, do Vitamin D and zinc to help with your immune system.

## 2024-08-28 ENCOUNTER — TELEPHONE (OUTPATIENT)
Dept: SURGERY | Age: 64
End: 2024-08-28

## 2024-08-28 NOTE — TELEPHONE ENCOUNTER
Pt called in needing to cx f/u and surgery with Dr. Ordaz that was scheduled 10/2 as her  will be having surgery and won't be able to drive for 2 months. Pt states she will call back at a later time to make appointment with Dr. Ordaz and to r/s surgery

## 2024-09-16 DIAGNOSIS — E66.1 CLASS 1 DRUG-INDUCED OBESITY WITH SERIOUS COMORBIDITY AND BODY MASS INDEX (BMI) OF 30.0 TO 30.9 IN ADULT: ICD-10-CM

## 2024-09-16 RX ORDER — CYANOCOBALAMIN 1000 UG/ML
1000 INJECTION, SOLUTION INTRAMUSCULAR; SUBCUTANEOUS
Qty: 3 ML | Refills: 3 | Status: SHIPPED | OUTPATIENT
Start: 2024-09-16

## 2024-09-16 RX ORDER — TIRZEPATIDE 5 MG/.5ML
5 INJECTION, SOLUTION SUBCUTANEOUS WEEKLY
Qty: 2 ML | Refills: 2 | Status: SHIPPED | OUTPATIENT
Start: 2024-09-16

## 2024-09-16 RX ORDER — TIRZEPATIDE 2.5 MG/.5ML
2.5 INJECTION, SOLUTION SUBCUTANEOUS WEEKLY
Qty: 2 ML | Refills: 1 | Status: CANCELLED | OUTPATIENT
Start: 2024-09-16

## 2024-09-21 SDOH — ECONOMIC STABILITY: FOOD INSECURITY: WITHIN THE PAST 12 MONTHS, YOU WORRIED THAT YOUR FOOD WOULD RUN OUT BEFORE YOU GOT MONEY TO BUY MORE.: NEVER TRUE

## 2024-09-21 SDOH — ECONOMIC STABILITY: INCOME INSECURITY: HOW HARD IS IT FOR YOU TO PAY FOR THE VERY BASICS LIKE FOOD, HOUSING, MEDICAL CARE, AND HEATING?: NOT HARD AT ALL

## 2024-09-21 SDOH — ECONOMIC STABILITY: FOOD INSECURITY: WITHIN THE PAST 12 MONTHS, THE FOOD YOU BOUGHT JUST DIDN'T LAST AND YOU DIDN'T HAVE MONEY TO GET MORE.: NEVER TRUE

## 2024-09-21 SDOH — ECONOMIC STABILITY: TRANSPORTATION INSECURITY
IN THE PAST 12 MONTHS, HAS LACK OF TRANSPORTATION KEPT YOU FROM MEETINGS, WORK, OR FROM GETTING THINGS NEEDED FOR DAILY LIVING?: NO

## 2024-09-23 ENCOUNTER — OFFICE VISIT (OUTPATIENT)
Dept: FAMILY MEDICINE CLINIC | Age: 64
End: 2024-09-23
Payer: COMMERCIAL

## 2024-09-23 VITALS
BODY MASS INDEX: 30.18 KG/M2 | HEART RATE: 94 BPM | WEIGHT: 164 LBS | DIASTOLIC BLOOD PRESSURE: 70 MMHG | SYSTOLIC BLOOD PRESSURE: 134 MMHG | HEIGHT: 62 IN | RESPIRATION RATE: 16 BRPM | OXYGEN SATURATION: 96 % | TEMPERATURE: 97.2 F

## 2024-09-23 DIAGNOSIS — M99.04 SEGMENTAL DYSFUNCTION OF SACRAL REGION: ICD-10-CM

## 2024-09-23 DIAGNOSIS — M54.6 THORACIC SPINE PAIN: ICD-10-CM

## 2024-09-23 DIAGNOSIS — M99.02 SEGMENTAL DYSFUNCTION OF THORACIC REGION: ICD-10-CM

## 2024-09-23 DIAGNOSIS — M51.36 LUMBAR DEGENERATIVE DISC DISEASE: ICD-10-CM

## 2024-09-23 DIAGNOSIS — M99.05 SEGMENTAL AND SOMATIC DYSFUNCTION OF PELVIC REGION: ICD-10-CM

## 2024-09-23 DIAGNOSIS — M99.03 LUMBAR REGION SOMATIC DYSFUNCTION: ICD-10-CM

## 2024-09-23 DIAGNOSIS — M62.838 MUSCLE SPASM: Primary | ICD-10-CM

## 2024-09-23 DIAGNOSIS — M99.08 SEGMENTAL AND SOMATIC DYSFUNCTION OF RIB CAGE: ICD-10-CM

## 2024-09-23 PROCEDURE — 99212 OFFICE O/P EST SF 10 MIN: CPT | Performed by: FAMILY MEDICINE

## 2024-09-23 PROCEDURE — 98927 OSTEOPATH MANJ 5-6 REGIONS: CPT | Performed by: FAMILY MEDICINE

## 2024-09-23 RX ORDER — FLUOROURACIL 50 MG/G
CREAM TOPICAL 2 TIMES DAILY
COMMUNITY

## 2024-09-23 ASSESSMENT — ENCOUNTER SYMPTOMS
BACK PAIN: 1
SHORTNESS OF BREATH: 0
WHEEZING: 0
COUGH: 0

## 2024-10-17 DIAGNOSIS — F41.9 ANXIETY: ICD-10-CM

## 2024-10-17 RX ORDER — ESCITALOPRAM OXALATE 20 MG/1
20 TABLET ORAL DAILY
Qty: 30 TABLET | Refills: 2 | Status: SHIPPED | OUTPATIENT
Start: 2024-10-17 | End: 2024-12-16 | Stop reason: SDUPTHER

## 2024-10-17 NOTE — TELEPHONE ENCOUNTER
Jane Cottrell needs refill of   Requested Prescriptions     Pending Prescriptions Disp Refills    escitalopram (LEXAPRO) 20 MG tablet [Pharmacy Med Name: Escitalopram Oxalate Oral Tablet 20 MG] 30 tablet 0     Sig: TAKE 1 TABLET BY MOUTH EVERY DAY       Last Filled on:  04- #30 R-5 and sent to [facility] in [place], [place] by Alexandra Chamorro DO.       Last Visit Date:  09/23/2024    Next Visit Date:  Visit date not found

## 2024-10-24 ENCOUNTER — OFFICE VISIT (OUTPATIENT)
Dept: FAMILY MEDICINE CLINIC | Age: 64
End: 2024-10-24

## 2024-10-24 VITALS
WEIGHT: 155.2 LBS | DIASTOLIC BLOOD PRESSURE: 72 MMHG | TEMPERATURE: 97 F | HEIGHT: 62 IN | BODY MASS INDEX: 28.56 KG/M2 | RESPIRATION RATE: 16 BRPM | HEART RATE: 88 BPM | OXYGEN SATURATION: 96 % | SYSTOLIC BLOOD PRESSURE: 122 MMHG

## 2024-10-24 DIAGNOSIS — B00.1 COLD SORE: ICD-10-CM

## 2024-10-24 DIAGNOSIS — M99.03 LUMBAR REGION SOMATIC DYSFUNCTION: ICD-10-CM

## 2024-10-24 DIAGNOSIS — E53.8 B12 DEFICIENCY: Primary | ICD-10-CM

## 2024-10-24 DIAGNOSIS — M99.04 SEGMENTAL DYSFUNCTION OF SACRAL REGION: ICD-10-CM

## 2024-10-24 DIAGNOSIS — M99.02 SEGMENTAL DYSFUNCTION OF THORACIC REGION: ICD-10-CM

## 2024-10-24 DIAGNOSIS — M99.08 SEGMENTAL AND SOMATIC DYSFUNCTION OF RIB CAGE: ICD-10-CM

## 2024-10-24 DIAGNOSIS — M99.05 SEGMENTAL AND SOMATIC DYSFUNCTION OF PELVIC REGION: ICD-10-CM

## 2024-10-24 RX ORDER — CYANOCOBALAMIN 1000 UG/ML
1000 INJECTION, SOLUTION INTRAMUSCULAR; SUBCUTANEOUS ONCE
Status: COMPLETED | OUTPATIENT
Start: 2024-10-24 | End: 2024-10-24

## 2024-10-24 RX ORDER — NYSTATIN 100000 U/G
CREAM TOPICAL
COMMUNITY
Start: 2024-10-17

## 2024-10-24 RX ORDER — VALACYCLOVIR HYDROCHLORIDE 500 MG/1
500 TABLET, FILM COATED ORAL DAILY
Qty: 90 TABLET | Refills: 1 | Status: SHIPPED | OUTPATIENT
Start: 2024-10-24

## 2024-10-24 RX ORDER — OXYBUTYNIN CHLORIDE 5 MG/1
5 TABLET, EXTENDED RELEASE ORAL DAILY
COMMUNITY
Start: 2024-10-17

## 2024-10-24 RX ADMIN — CYANOCOBALAMIN 1000 MCG: 1000 INJECTION, SOLUTION INTRAMUSCULAR; SUBCUTANEOUS at 15:52

## 2024-10-24 ASSESSMENT — ENCOUNTER SYMPTOMS
WHEEZING: 0
SHORTNESS OF BREATH: 0
BACK PAIN: 1
COUGH: 0

## 2024-10-24 NOTE — PROGRESS NOTES
Administrations This Visit       cyanocobalamin injection 1,000 mcg       Admin Date  10/24/2024 Action  Given Dose  1,000 mcg Route  IntraMUSCular Documented By  Lula Alicea, ANIKETN

## 2024-10-24 NOTE — PROGRESS NOTES
Jane Cottrell (:  1960) is a 64 y.o. female,Established patient, here for evaluation of the following chief complaint(s):  Other (omt) and Mouth Lesions      Subjective   SUBJECTIVE/OBJECTIVE:  HPI  Patient presents with cold sores for years but had recent flare up that was more severe than normal  Took almost a week to resolve with taking once daily Valtrex and lysine  Also would like an adjustment, was squatting about 1 1/2 weeks ago and fell backwards on the right side  Pain located both knees, R low back and L upper back  Pain started 1 1/2 weeks ago  Treatment:  stretches, heat    Review of Systems   Constitutional:  Negative for activity change and fatigue.   Respiratory:  Negative for cough, shortness of breath and wheezing.    Cardiovascular:  Negative for chest pain, palpitations and leg swelling.   Genitourinary:         No bowel or bladder changes   Musculoskeletal:  Positive for back pain. Negative for gait problem.   Skin:  Positive for rash.          Objective   Physical Exam  Vitals reviewed.   Constitutional:       General: She is not in acute distress.     Appearance: Normal appearance. She is not ill-appearing.   HENT:      Head: Normocephalic and atraumatic.   Cardiovascular:      Rate and Rhythm: Normal rate and regular rhythm.      Heart sounds: No murmur heard.     No gallop.   Pulmonary:      Effort: Pulmonary effort is normal.      Breath sounds: Normal breath sounds. No wheezing, rhonchi or rales.   Musculoskeletal:      Comments: MS:  R anterior innominate, L on R sacral torsion, [id number], [id number], posterior 2nd to 4th ribs on the left, spasm lower lumbar on the right and upper thoracic on the left   Neurological:      Mental Status: She is alert.            Assessment & Plan   ASSESSMENT/PLAN:  1. B12 deficiency  -     cyanocobalamin injection 1,000 mcg; 1,000 mcg, IntraMUSCular, ONCE, 1 dose, On Thu 10/24/24 at 1615  2. Cold sore  -     valACYclovir (VALTREX) 500 MG tablet; Take 1

## 2024-11-26 ENCOUNTER — TELEPHONE (OUTPATIENT)
Dept: FAMILY MEDICINE CLINIC | Age: 64
End: 2024-11-26

## 2024-11-26 NOTE — TELEPHONE ENCOUNTER
[facility] called and would like clarification on how patient should be taking     Valacyclovir (valtrex) 500 mg  Sig: Take 1 tablet by mouth daily In case of tingling before rash then increase to 2 pills twice a day, if rash develops then increase to 2 pills three times per day for one week

## 2024-11-26 NOTE — TELEPHONE ENCOUNTER
Take one pill daily but if she notices tingling before rash then increase to 2 pills daily.  If a rash develops then increase to 2 pills three times per day for one week

## 2024-12-16 ENCOUNTER — OFFICE VISIT (OUTPATIENT)
Dept: FAMILY MEDICINE CLINIC | Age: 64
End: 2024-12-16

## 2024-12-16 VITALS
TEMPERATURE: 97.3 F | WEIGHT: 152 LBS | DIASTOLIC BLOOD PRESSURE: 68 MMHG | OXYGEN SATURATION: 97 % | BODY MASS INDEX: 27.8 KG/M2 | HEART RATE: 90 BPM | SYSTOLIC BLOOD PRESSURE: 106 MMHG

## 2024-12-16 DIAGNOSIS — M99.03 LUMBAR REGION SOMATIC DYSFUNCTION: ICD-10-CM

## 2024-12-16 DIAGNOSIS — M99.01 CERVICAL (NECK) REGION SOMATIC DYSFUNCTION: ICD-10-CM

## 2024-12-16 DIAGNOSIS — F41.9 ANXIETY: ICD-10-CM

## 2024-12-16 DIAGNOSIS — M99.04 SEGMENTAL DYSFUNCTION OF SACRAL REGION: ICD-10-CM

## 2024-12-16 DIAGNOSIS — M99.05 SEGMENTAL AND SOMATIC DYSFUNCTION OF PELVIC REGION: ICD-10-CM

## 2024-12-16 DIAGNOSIS — R76.8 CENTROMERE ANTIBODY POSITIVE: Primary | ICD-10-CM

## 2024-12-16 DIAGNOSIS — M99.08 SEGMENTAL AND SOMATIC DYSFUNCTION OF RIB CAGE: ICD-10-CM

## 2024-12-16 DIAGNOSIS — M99.02 SEGMENTAL DYSFUNCTION OF THORACIC REGION: ICD-10-CM

## 2024-12-16 PROBLEM — S83.412A GRADE 2 SPRAIN OF MEDIAL COLLATERAL LIGAMENT OF LEFT KNEE: Status: ACTIVE | Noted: 2024-11-01

## 2024-12-16 PROBLEM — S83.8X2A: Status: ACTIVE | Noted: 2024-11-01

## 2024-12-16 RX ORDER — FUROSEMIDE 20 MG/1
20 TABLET ORAL AS NEEDED
Qty: 30 TABLET | Refills: 0 | Status: SHIPPED | OUTPATIENT
Start: 2024-12-16

## 2024-12-16 RX ORDER — DEXLANSOPRAZOLE 60 MG/1
60 CAPSULE, DELAYED RELEASE ORAL DAILY
COMMUNITY
Start: 2024-11-26

## 2024-12-16 RX ORDER — ESCITALOPRAM OXALATE 20 MG/1
20 TABLET ORAL DAILY
Qty: 90 TABLET | Refills: 3 | Status: SHIPPED | OUTPATIENT
Start: 2024-12-16

## 2024-12-16 ASSESSMENT — ENCOUNTER SYMPTOMS
BACK PAIN: 1
WHEEZING: 0
COUGH: 0
SHORTNESS OF BREATH: 0

## 2024-12-16 NOTE — PROGRESS NOTES
lower leg: No edema.      Comments: MS:  R anterior innominate, L on R sacral torsion, [id number], U7PJsFb, posterior 2nd to 4th ribs on the right, [id number]   Neurological:      Mental Status: She is alert.            Assessment & Plan   ASSESSMENT/PLAN:  1. Centromere antibody positive  -     Anti-Centromere AB; Future  -     Rheumatoid Factor; Future  -     Cyclic Citrul Peptide Antibody, IgG; Future  -     Brain Natriuretic Peptide; Future  -     Comprehensive Metabolic Panel; Future  -     Uric Acid; Future  -     C-Reactive Protein; Future  2. Anxiety  -     escitalopram (LEXAPRO) 20 MG tablet; Take 1 tablet by mouth daily, Disp-90 tablet, R-3Normal  3. Segmental dysfunction of thoracic region  4. Segmental and somatic dysfunction of rib cage  5. Cervical (neck) region somatic dysfunction  6. Segmental dysfunction of sacral region  7. Lumbar region somatic dysfunction  8. Segmental and somatic dysfunction of pelvic region    OMT 6 regions.   HVLA-lumbar.  ME-pelvis, sacrum, cervical.  Lumbar roll lumbar.  Myofascial-thoracic. Still rib.  Patient tolerated well.  Improvement noted in pain and function.    Return if symptoms worsen or fail to improve.               An electronic signature was used to authenticate this note.    --Alexandra Chamorro, DO

## 2024-12-18 ENCOUNTER — PATIENT MESSAGE (OUTPATIENT)
Dept: FAMILY MEDICINE CLINIC | Age: 64
End: 2024-12-18

## 2024-12-18 DIAGNOSIS — M79.7 FIBROMYALGIA: ICD-10-CM

## 2024-12-18 DIAGNOSIS — R76.8 CENTROMERE ANTIBODY POSITIVE: Primary | ICD-10-CM

## 2024-12-18 LAB
ALBUMIN: 4.2 G/DL
ALP BLD-CCNC: 109 U/L
ALT SERPL-CCNC: 13 U/L
ANION GAP SERPL CALCULATED.3IONS-SCNC: 11 MMOL/L
AST SERPL-CCNC: 24 U/L
B-TYPE NATRIURETIC PEPTIDE: 50 PG/ML
BILIRUB SERPL-MCNC: 1.1 MG/DL (ref 0.1–1.4)
BUN BLDV-MCNC: 21 MG/DL
C-REACTIVE PROTEIN: 0.6
CALCIUM SERPL-MCNC: 8.9 MG/DL
CHLORIDE BLD-SCNC: 107 MMOL/L
CO2: 27 MMOL/L
CREAT SERPL-MCNC: 0.71 MG/DL
GFR, ESTIMATED: 95
GLUCOSE BLD-MCNC: 82 MG/DL
POTASSIUM SERPL-SCNC: 3.8 MMOL/L
RHEUMATOID FACTOR: 38
SODIUM BLD-SCNC: 145 MMOL/L
TOTAL PROTEIN: 6.5 G/DL (ref 6.4–8.2)
URIC ACID: 3.3

## 2025-02-05 DIAGNOSIS — E66.1 CLASS 1 DRUG-INDUCED OBESITY WITH SERIOUS COMORBIDITY AND BODY MASS INDEX (BMI) OF 30.0 TO 30.9 IN ADULT: ICD-10-CM

## 2025-02-05 DIAGNOSIS — E66.811 CLASS 1 DRUG-INDUCED OBESITY WITH SERIOUS COMORBIDITY AND BODY MASS INDEX (BMI) OF 30.0 TO 30.9 IN ADULT: ICD-10-CM

## 2025-02-06 RX ORDER — TIRZEPATIDE 5 MG/.5ML
5 INJECTION, SOLUTION SUBCUTANEOUS WEEKLY
Qty: 2 ML | Refills: 2 | Status: SHIPPED | OUTPATIENT
Start: 2025-02-06

## 2025-02-06 NOTE — TELEPHONE ENCOUNTER
Jane Cottrell needs refill of   Requested Prescriptions     Pending Prescriptions Disp Refills    tirzepatide-weight management (ZEPBOUND) 5 MG/0.5ML SOAJ subCUTAneous auto-injector pen 2 mL 2     Sig: Inject 5 mg into the skin once a week       Last Filled on:  09/16/2024 #2mL R-2 sent to [facility] on [address] by Alexandra Chamorro DO.     Last Visit Date:  12/16/2024    Next Visit Date:  Visit date not found

## 2025-02-20 ENCOUNTER — OFFICE VISIT (OUTPATIENT)
Dept: FAMILY MEDICINE CLINIC | Age: 65
End: 2025-02-20

## 2025-02-20 VITALS
BODY MASS INDEX: 28.74 KG/M2 | DIASTOLIC BLOOD PRESSURE: 84 MMHG | HEART RATE: 75 BPM | TEMPERATURE: 97.6 F | WEIGHT: 157.13 LBS | OXYGEN SATURATION: 97 % | SYSTOLIC BLOOD PRESSURE: 142 MMHG

## 2025-02-20 DIAGNOSIS — M99.02 SEGMENTAL DYSFUNCTION OF THORACIC REGION: ICD-10-CM

## 2025-02-20 DIAGNOSIS — M99.08 SEGMENTAL AND SOMATIC DYSFUNCTION OF RIB CAGE: ICD-10-CM

## 2025-02-20 DIAGNOSIS — R03.0 ELEVATED BLOOD PRESSURE READING: ICD-10-CM

## 2025-02-20 DIAGNOSIS — M99.05 SEGMENTAL AND SOMATIC DYSFUNCTION OF PELVIC REGION: ICD-10-CM

## 2025-02-20 DIAGNOSIS — M99.01 CERVICAL (NECK) REGION SOMATIC DYSFUNCTION: ICD-10-CM

## 2025-02-20 DIAGNOSIS — M99.03 LUMBAR REGION SOMATIC DYSFUNCTION: ICD-10-CM

## 2025-02-20 DIAGNOSIS — J30.89 NON-SEASONAL ALLERGIC RHINITIS, UNSPECIFIED TRIGGER: Primary | ICD-10-CM

## 2025-02-20 DIAGNOSIS — M99.04 SEGMENTAL DYSFUNCTION OF SACRAL REGION: ICD-10-CM

## 2025-02-20 PROBLEM — M25.561 PAIN, JOINT, KNEE, RIGHT: Status: ACTIVE | Noted: 2025-02-20

## 2025-02-20 PROBLEM — M25.562 PAIN, JOINT, KNEE, LEFT: Status: ACTIVE | Noted: 2025-02-20

## 2025-02-20 PROBLEM — M17.9 OSTEOARTHRITIS OF KNEE: Status: ACTIVE | Noted: 2025-02-20

## 2025-02-20 RX ORDER — GABAPENTIN 300 MG/1
600 CAPSULE ORAL 2 TIMES DAILY
Qty: 360 CAPSULE | Refills: 1 | Status: SHIPPED | OUTPATIENT
Start: 2025-02-20 | End: 2025-08-19

## 2025-02-20 RX ORDER — PREDNISONE 10 MG/1
TABLET ORAL
Qty: 30 TABLET | Refills: 0 | Status: SHIPPED | OUTPATIENT
Start: 2025-02-20 | End: 2025-03-04

## 2025-02-20 SDOH — ECONOMIC STABILITY: FOOD INSECURITY: WITHIN THE PAST 12 MONTHS, YOU WORRIED THAT YOUR FOOD WOULD RUN OUT BEFORE YOU GOT MONEY TO BUY MORE.: NEVER TRUE

## 2025-02-20 SDOH — ECONOMIC STABILITY: INCOME INSECURITY: IN THE LAST 12 MONTHS, WAS THERE A TIME WHEN YOU WERE NOT ABLE TO PAY THE MORTGAGE OR RENT ON TIME?: NO

## 2025-02-20 SDOH — ECONOMIC STABILITY: FOOD INSECURITY: WITHIN THE PAST 12 MONTHS, THE FOOD YOU BOUGHT JUST DIDN'T LAST AND YOU DIDN'T HAVE MONEY TO GET MORE.: NEVER TRUE

## 2025-02-20 SDOH — ECONOMIC STABILITY: TRANSPORTATION INSECURITY
IN THE PAST 12 MONTHS, HAS THE LACK OF TRANSPORTATION KEPT YOU FROM MEDICAL APPOINTMENTS OR FROM GETTING MEDICATIONS?: NO

## 2025-02-20 ASSESSMENT — PATIENT HEALTH QUESTIONNAIRE - PHQ9
2. FEELING DOWN, DEPRESSED OR HOPELESS: NOT AT ALL
2. FEELING DOWN, DEPRESSED OR HOPELESS: NOT AT ALL
SUM OF ALL RESPONSES TO PHQ QUESTIONS 1-9: 0
1. LITTLE INTEREST OR PLEASURE IN DOING THINGS: NOT AT ALL
SUM OF ALL RESPONSES TO PHQ QUESTIONS 1-9: 0
1. LITTLE INTEREST OR PLEASURE IN DOING THINGS: NOT AT ALL
SUM OF ALL RESPONSES TO PHQ9 QUESTIONS 1 & 2: 0
SUM OF ALL RESPONSES TO PHQ9 QUESTIONS 1 & 2: 0
SUM OF ALL RESPONSES TO PHQ QUESTIONS 1-9: 0
SUM OF ALL RESPONSES TO PHQ QUESTIONS 1-9: 0

## 2025-02-20 ASSESSMENT — ENCOUNTER SYMPTOMS
RHINORRHEA: 1
SORE THROAT: 1
VOMITING: 0
SINUS PRESSURE: 0
COUGH: 1
SHORTNESS OF BREATH: 0
NAUSEA: 0
TROUBLE SWALLOWING: 0
CONSTIPATION: 0
DIARRHEA: 0
SINUS PAIN: 0
BACK PAIN: 1
WHEEZING: 0
CHEST TIGHTNESS: 0

## 2025-02-20 NOTE — PROGRESS NOTES
Jane Cottrell (:  1960) is a 64 y.o. female,Established patient, here for evaluation of the following chief complaint(s):  Medication Refill (Gabapentin refill needed), Other (Adjustment ), Ear Pain (Patient just got home from [place] last night. Feels as if her ear needs to pop, but can't. Would like examined. ), and Dry Eye (Dx from an eye doctor in [place] of punctate keratitis and dry eye syndrome. Patient to see a specialist tomorrow in [place]. )      Subjective   SUBJECTIVE/OBJECTIVE:  HPI  Patient presents with cold symptoms for almost 2 months  Known exposures in [place], known increase in [place]ever pollen exposure  Treatment Mucinex DM, Claritin  Better mild help with medications   Worse after flight, now her R ear feels plugged  Also very stiff from recent flight, playing with her 5 year old grandson  Seeing surgeon tomorrow for hernia, eye doctor tomorrow (tear duct issue), saw ortho this morning (would like to get gel injections in both knees)  Hasn't been taking her blood pressure medication since less stress when she was visiting in [place]    Review of Systems   Constitutional:  Positive for fatigue. Negative for activity change, chills and fever.   HENT:  Positive for congestion, ear pain (right), postnasal drip, rhinorrhea, sneezing and sore throat (scratchy). Negative for sinus pressure, sinus pain and trouble swallowing.    Respiratory:  Positive for cough (clearing throat). Negative for chest tightness, shortness of breath and wheezing.    Cardiovascular:  Negative for chest pain, palpitations and leg swelling.   Gastrointestinal:  Negative for constipation, diarrhea, nausea and vomiting.   Musculoskeletal:  Positive for back pain, gait problem (due to knee pain) and neck stiffness. Negative for arthralgias.   Skin:  Negative for rash.   Neurological:  Negative for dizziness, light-headedness and headaches.   Hematological:  Negative for adenopathy.          Objective   Physical  4/29/20  Juan Sarah  1979    Unemployed    FLU/COVID-19 CLINIC EVALUATION    HPI SYMPTOMS:  Cristian Edmonds is a 45year old male who is in with 3 days of flu like symptoms. He states his symptoms have been runny nose which seems to be increasing and excess fatigue. 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 He has a wound vac in place and a PICC line where he gives himself antibiotics at home. He states he sees the ID physician who suggested he have Covid testing. [] Fevers  [] Chills  [] Cough  [] Coughing up blood  [] Chest Congestion  [x] Nasal Congestion  [] Feeling short of breath  [] Sometimes  [] Frequently  [] All the time  [] Chest pain  [] Headaches  []Tolerable  [] Severe  [] Sore throat  [] Muscle aches  [] Nausea  [] Vomiting  []Unable to keep fluids down  [] Diarrhea  []Severe    [x] OTHER SYMPTOMS:fatigue      Symptom Duration:   [] 1  [] 2   [x] 3   [] 4    [] 5   [] 6   [] 7   [] 8   [] 9   [] 10   [] 11   [] 12   [] 13   [] 14   [] Longer than 14 days    Symptom course:   [x] Worsening     [] Stable     [] Improving    RISK FACTORS:    [] Pregnant or possibly pregnant  [] Age over 61  [x] Diabetes  [] Heart disease  [] Asthma  [] COPD/Other chronic lung diseases  [] Active Cancer  [] On Chemotherapy  [] Taking oral steroids  [] History Lymphoma/Leukemia  [] Close contact with a lab confirmed COVID-19 patient within 14 days of symptom onset  [] History of travel from affected geographical areas within 14 days of symptom onset       VITALS:  Vitals:    04/29/20 1655   Pulse: 60   Temp: 97.1 °F (36.2 °C)   TempSrc: Infrared   SpO2: 97%      Physical Exam   Constitutional: He appears healthy. No distress. HENT:   Nose: Nasal discharge (clear) present. Mouth/Throat: Oropharynx is clear. Neck: Normal range of motion. Neck supple. No neck adenopathy. Cardiovascular: Normal rate, regular rhythm, S1 normal, S2 normal and normal heart sounds.    Pulmonary/Chest: Effort hospitalized  and   People with confirmed COVID-19 who were hospitalized and determined to be medically stable to go home    Your healthcare provider and public health staff will evaluate whether you can be cared for at home. If it is determined that you do not need to be hospitalized and can be isolated at home, you will be monitored by staff from your local or state health department. You should follow the prevention steps below until a healthcare provider or local or state health department says you can return to your normal activities. Stay home except to get medical care  People who are mildly ill with COVID-19 are able to isolate at home during their illness. You should restrict activities outside your home, except for getting medical care. Do not go to work, school, or public areas. Avoid using public transportation, ride-sharing, or taxis. Separate yourself from other people and animals in your home  People: As much as possible, you should stay in a specific room and away from other people in your home. Also, you should use a separate bathroom, if available. Animals: You should restrict contact with pets and other animals while you are sick with COVID-19, just like you would around other people. Although there have not been reports of pets or other animals becoming sick with COVID-19, it is still recommended that people sick with COVID-19 limit contact with animals until more information is known about the virus. When possible, have another member of your household care for your animals while you are sick. If you are sick with COVID-19, avoid contact with your pet, including petting, snuggling, being kissed or licked, and sharing food. If you must care for your pet or be around animals while you are sick, wash your hands before and after you interact with pets and wear a facemask.   Call ahead before visiting your doctor  If you have a medical appointment, call the healthcare provider and tell them that you have or may have COVID-19. This will help the healthcare providers office take steps to keep other people from getting infected or exposed. Wear a facemask  You should wear a facemask when you are around other people (e.g., sharing a room or vehicle) or pets and before you enter a healthcare providers office. If you are not able to wear a facemask (for example, because it causes trouble breathing), then people who live with you should not stay in the same room with you, or they should wear a facemask if they enter your room. Cover your coughs and sneezes  Cover your mouth and nose with a tissue when you cough or sneeze. Throw used tissues in a lined trash can. Immediately wash your hands with soap and water for at least 20 seconds or, if soap and water are not available, clean your hands with an alcohol-based hand  that contains at least 60% alcohol. Clean your hands often  Wash your hands often with soap and water for at least 20 seconds, especially after blowing your nose, coughing, or sneezing; going to the bathroom; and before eating or preparing food. If soap and water are not readily available, use an alcohol-based hand  with at least 60% alcohol, covering all surfaces of your hands and rubbing them together until they feel dry. Soap and water are the best option if hands are visibly dirty. Avoid touching your eyes, nose, and mouth with unwashed hands. Avoid sharing personal household items  You should not share dishes, drinking glasses, cups, eating utensils, towels, or bedding with other people or pets in your home. After using these items, they should be washed thoroughly with soap and water. Clean all high-touch surfaces everyday  High touch surfaces include counters, tabletops, doorknobs, bathroom fixtures, toilets, phones, keyboards, tablets, and bedside tables. Also, clean any surfaces that may have blood, stool, or body fluids on them.  Use a household cleaning spray or

## 2025-03-27 DIAGNOSIS — M62.838 MUSCLE SPASM: ICD-10-CM

## 2025-03-27 NOTE — TELEPHONE ENCOUNTER
Jane Cottrell needs refill of   Requested Prescriptions     Pending Prescriptions Disp Refills    tiZANidine (ZANAFLEX) 4 MG tablet 60 tablet 2     Sig: Take 1 tablet by mouth 2 times daily as needed (spasm)       Last Filled on:  03/05/2024 #60 R-2 sent to [facility] on [address] by Alexandra Chamorro DO.     Last Visit Date:  2/20/2025    Next Visit Date:  4/1/2025    none

## 2025-03-30 LAB
BASOPHILS ABSOLUTE: 0.03 /ΜL
BASOPHILS RELATIVE PERCENT: 0.8 %
C-REACTIVE PROTEIN: 0.1
EOSINOPHILS ABSOLUTE: 0.17 /ΜL
EOSINOPHILS RELATIVE PERCENT: 4.3 %
HCT VFR BLD CALC: 38.6 % (ref 36–46)
HEMOGLOBIN: 12.3 G/DL (ref 12–16)
LYMPHOCYTES ABSOLUTE: 1.51 /ΜL
LYMPHOCYTES RELATIVE PERCENT: 38 %
MCH RBC QN AUTO: 31.5 PG
MCHC RBC AUTO-ENTMCNC: 31.9 G/DL
MCV RBC AUTO: 99 FL
MONOCYTES ABSOLUTE: 0.54 /ΜL
MONOCYTES RELATIVE PERCENT: 13.6 %
NEUTROPHILS ABSOLUTE: 1.71 /ΜL
NEUTROPHILS RELATIVE PERCENT: 43 %
PDW BLD-RTO: 13.5 %
PLATELET # BLD: 313 K/ΜL
PMV BLD AUTO: ABNORMAL FL
RBC # BLD: 3.91 10^6/ΜL
RHEUMATOID FACTOR: 56
SED RATE, AUTOMATED: 13
WBC # BLD: 4 10^3/ML

## 2025-03-31 DIAGNOSIS — M35.01 SJOGREN'S SYNDROME WITH KERATOCONJUNCTIVITIS SICCA: ICD-10-CM

## 2025-04-01 ENCOUNTER — OFFICE VISIT (OUTPATIENT)
Dept: FAMILY MEDICINE CLINIC | Age: 65
End: 2025-04-01
Payer: COMMERCIAL

## 2025-04-01 VITALS
TEMPERATURE: 97.4 F | OXYGEN SATURATION: 95 % | SYSTOLIC BLOOD PRESSURE: 132 MMHG | RESPIRATION RATE: 16 BRPM | HEART RATE: 89 BPM | WEIGHT: 160.2 LBS | DIASTOLIC BLOOD PRESSURE: 76 MMHG | BODY MASS INDEX: 29.48 KG/M2 | HEIGHT: 62 IN

## 2025-04-01 DIAGNOSIS — R76.8 ELEVATED RHEUMATOID FACTOR: ICD-10-CM

## 2025-04-01 DIAGNOSIS — Z00.00 ENCOUNTER FOR WELL ADULT EXAM WITHOUT ABNORMAL FINDINGS: Primary | ICD-10-CM

## 2025-04-01 DIAGNOSIS — M79.7 FIBROMYALGIA: ICD-10-CM

## 2025-04-01 PROBLEM — N20.0 NEPHROLITHIASIS: Status: RESOLVED | Noted: 2024-05-21 | Resolved: 2025-04-01

## 2025-04-01 PROCEDURE — 99396 PREV VISIT EST AGE 40-64: CPT | Performed by: FAMILY MEDICINE

## 2025-04-01 RX ORDER — OMEPRAZOLE 40 MG/1
40 CAPSULE, DELAYED RELEASE ORAL 2 TIMES DAILY
COMMUNITY
Start: 2025-03-03

## 2025-04-01 RX ORDER — GABAPENTIN 600 MG/1
600 TABLET ORAL 2 TIMES DAILY
Qty: 180 TABLET | Refills: 1 | Status: SHIPPED | OUTPATIENT
Start: 2025-04-01 | End: 2025-09-28

## 2025-04-01 RX ORDER — FLUOROMETHOLONE 1 MG/ML
SUSPENSION/ DROPS OPHTHALMIC
COMMUNITY
Start: 2025-03-27

## 2025-04-01 RX ORDER — FUROSEMIDE 20 MG/1
20 TABLET ORAL AS NEEDED
Qty: 30 TABLET | Refills: 0 | Status: SHIPPED | OUTPATIENT
Start: 2025-04-01

## 2025-04-01 ASSESSMENT — ENCOUNTER SYMPTOMS
RHINORRHEA: 0
CONSTIPATION: 0
SINUS PRESSURE: 0
COUGH: 0
SORE THROAT: 0
CHEST TIGHTNESS: 0
DIARRHEA: 0
SHORTNESS OF BREATH: 0
NAUSEA: 0
ABDOMINAL PAIN: 1
VOMITING: 0
WHEEZING: 0

## 2025-04-01 NOTE — PROGRESS NOTES
Jane Cottrell (:  1960) is a 64 y.o. female,Established patient, here for evaluation of the following chief complaint(s):  Annual Exam      Subjective   SUBJECTIVE/OBJECTIVE:  HPI  Diet comfort eating due to stress  Exercise walking  Tobacco use denies  Alcohol use social  BP yesterday was 118/64 at rheumatologist  May take up to 900 mg at bedtime to help with the restless leg syndrome  Will be going for over 20 days on transatlantic cruise and visits to [facility] and [place] with friends  Hernia surgery scheduled after appt on 2025    Review of Systems   Constitutional:  Positive for fatigue. Negative for activity change, appetite change, fever and unexpected weight change.   HENT:  Positive for ear pain (referred from left sided neck pain). Negative for congestion, rhinorrhea, sinus pressure and sore throat.    Eyes:  Negative for visual disturbance.   Respiratory:  Negative for cough, chest tightness, shortness of breath and wheezing.    Cardiovascular:  Negative for chest pain, palpitations and leg swelling.   Gastrointestinal:  Positive for abdominal pain. Negative for constipation, diarrhea, nausea and vomiting.   Genitourinary:  Negative for dysuria, flank pain, hematuria and urgency.   Musculoskeletal:  Positive for gait problem (doing shots with ortho, just had them done today). Negative for arthralgias and myalgias.   Skin:  Negative for rash.        Hair loss since returning home from [place]   Neurological:  Negative for dizziness, light-headedness and headaches.   Psychiatric/Behavioral:  Positive for dysphoric mood (worse since getting back from [place], added stress with ). Negative for sleep disturbance. The patient is nervous/anxious.           Objective   Physical Exam  Vitals reviewed.   Constitutional:       General: She is not in acute distress.     Appearance: Normal appearance. She is not ill-appearing.   HENT:      Head: Normocephalic and atraumatic.      Right Ear: Tympanic

## 2025-04-08 ASSESSMENT — ENCOUNTER SYMPTOMS: BACK PAIN: 1

## 2025-04-08 NOTE — PROGRESS NOTES
Jane Cottrell (:  1960) is a 64 y.o. female,Established patient, here for evaluation of the following chief complaint(s):  Follow-up (omt)      Subjective   SUBJECTIVE/OBJECTIVE:  HPI  Pain located upper back on the left side  Pain started weeks ago  Treatment:  stretches, heat, occasional ibuprofen  Worsens: end of the day  Improves: stretches and heat help  Would like some OMT prior to leaving on her cruise    Review of Systems   Constitutional:  Positive for fatigue (mild). Negative for activity change, appetite change, fever and unexpected weight change.   Respiratory:  Negative for cough, shortness of breath and wheezing.    Cardiovascular:  Negative for chest pain, palpitations and leg swelling.   Genitourinary:         No bowel or bladder changes   Musculoskeletal:  Positive for back pain. Negative for arthralgias and myalgias.   Neurological:  Negative for dizziness, light-headedness and headaches.          Objective   Physical Exam  Vitals reviewed.   Constitutional:       General: She is not in acute distress.     Appearance: Normal appearance. She is not ill-appearing.   HENT:      Head: Normocephalic and atraumatic.   Cardiovascular:      Rate and Rhythm: Normal rate and regular rhythm.      Heart sounds: No murmur heard.     No gallop.   Pulmonary:      Effort: Pulmonary effort is normal.      Breath sounds: Normal breath sounds. No wheezing, rhonchi or rales.   Musculoskeletal:      Right lower leg: No edema.      Left lower leg: No edema.      Comments: MS:  [id number], posterior 2nd to 4th ribs on the left, [id number], AA rotated left   Neurological:      Mental Status: She is alert.            Assessment & Plan   ASSESSMENT/PLAN:  1. Thoracic spine pain  2. Cervicalgia  3. Cervical (neck) region somatic dysfunction  4. Segmental and somatic dysfunction of rib cage  5. Segmental dysfunction of thoracic region    OMT 3 regions.   ME-cervical.  Lumbar roll lumbar.  Myofascial-thoracic. Still rib.

## 2025-04-09 ENCOUNTER — OFFICE VISIT (OUTPATIENT)
Dept: FAMILY MEDICINE CLINIC | Age: 65
End: 2025-04-09
Payer: COMMERCIAL

## 2025-04-09 VITALS
RESPIRATION RATE: 14 BRPM | DIASTOLIC BLOOD PRESSURE: 68 MMHG | BODY MASS INDEX: 29.48 KG/M2 | WEIGHT: 160.2 LBS | HEIGHT: 62 IN | HEART RATE: 74 BPM | SYSTOLIC BLOOD PRESSURE: 124 MMHG | OXYGEN SATURATION: 95 % | TEMPERATURE: 97.5 F

## 2025-04-09 DIAGNOSIS — M99.08 SEGMENTAL AND SOMATIC DYSFUNCTION OF RIB CAGE: ICD-10-CM

## 2025-04-09 DIAGNOSIS — M99.02 SEGMENTAL DYSFUNCTION OF THORACIC REGION: ICD-10-CM

## 2025-04-09 DIAGNOSIS — M99.01 CERVICAL (NECK) REGION SOMATIC DYSFUNCTION: ICD-10-CM

## 2025-04-09 DIAGNOSIS — M54.6 THORACIC SPINE PAIN: Primary | ICD-10-CM

## 2025-04-09 DIAGNOSIS — M54.2 CERVICALGIA: ICD-10-CM

## 2025-04-09 PROCEDURE — 98926 OSTEOPATH MANJ 3-4 REGIONS: CPT | Performed by: FAMILY MEDICINE

## 2025-04-09 ASSESSMENT — ENCOUNTER SYMPTOMS
COUGH: 0
SHORTNESS OF BREATH: 0
WHEEZING: 0

## 2025-06-11 ENCOUNTER — OFFICE VISIT (OUTPATIENT)
Dept: FAMILY MEDICINE CLINIC | Age: 65
End: 2025-06-11
Payer: COMMERCIAL

## 2025-06-11 VITALS
DIASTOLIC BLOOD PRESSURE: 72 MMHG | BODY MASS INDEX: 29.66 KG/M2 | WEIGHT: 161.2 LBS | RESPIRATION RATE: 16 BRPM | HEIGHT: 62 IN | OXYGEN SATURATION: 98 % | TEMPERATURE: 98.3 F | HEART RATE: 60 BPM | SYSTOLIC BLOOD PRESSURE: 122 MMHG

## 2025-06-11 DIAGNOSIS — F41.9 ANXIETY: Primary | ICD-10-CM

## 2025-06-11 PROCEDURE — 1036F TOBACCO NON-USER: CPT | Performed by: FAMILY MEDICINE

## 2025-06-11 PROCEDURE — 99213 OFFICE O/P EST LOW 20 MIN: CPT | Performed by: FAMILY MEDICINE

## 2025-06-11 PROCEDURE — 3017F COLORECTAL CA SCREEN DOC REV: CPT | Performed by: FAMILY MEDICINE

## 2025-06-11 PROCEDURE — G8427 DOCREV CUR MEDS BY ELIG CLIN: HCPCS | Performed by: FAMILY MEDICINE

## 2025-06-11 PROCEDURE — G8417 CALC BMI ABV UP PARAM F/U: HCPCS | Performed by: FAMILY MEDICINE

## 2025-06-11 RX ORDER — CYCLOSPORINE 0.5 MG/ML
1 EMULSION OPHTHALMIC 4 TIMES DAILY
COMMUNITY
Start: 2025-03-14

## 2025-06-11 RX ORDER — SENNOSIDES 8.6 MG
650 CAPSULE ORAL EVERY 6 HOURS
COMMUNITY
Start: 2025-06-05

## 2025-06-11 ASSESSMENT — ENCOUNTER SYMPTOMS
SHORTNESS OF BREATH: 0
VOMITING: 0
CONSTIPATION: 0
DIARRHEA: 0
ABDOMINAL PAIN: 0
NAUSEA: 0

## 2025-06-11 NOTE — PROGRESS NOTES
Jane Cottrell (:  1960) is a 65 y.o. female,Established patient, here for evaluation of the following chief complaint(s):  Discuss Medications      Subjective   SUBJECTIVE/OBJECTIVE:  HPI  No problems with her anxiety and depression during recent 4 week vacation or while visiting with her son in [place]  Main issues are surrounding her  and his chronic illnesses  Having more problems with her ---memory is getting worse, causing more anxiety.  He refused to go to see specialist in [place].  Not getting out of bed until 5 PM at times.  [place] like he didn't even realize she was gone for nearly a month  Looking to fly to [place] to visit with her son to finish recovering    Review of Systems   Constitutional:  Positive for fatigue. Negative for activity change and unexpected weight change.   Respiratory:  Negative for shortness of breath.    Cardiovascular:  Negative for chest pain.   Gastrointestinal:  Negative for abdominal pain, constipation, diarrhea, nausea and vomiting.   Neurological:  Negative for dizziness and headaches.   Psychiatric/Behavioral:  Negative for confusion, decreased concentration, sleep disturbance and suicidal ideas. The patient is nervous/anxious (tearful).           Objective   Physical Exam  Constitutional:       Appearance: Normal appearance.   Cardiovascular:      Rate and Rhythm: Normal rate and regular rhythm.      Heart sounds: No murmur heard.     No gallop.   Pulmonary:      Effort: Pulmonary effort is normal.      Breath sounds: Normal breath sounds. No wheezing, rhonchi or rales.   Abdominal:      General: Abdomen is flat. Bowel sounds are normal. There is no distension.      Palpations: Abdomen is soft.      Tenderness: There is no abdominal tenderness. There is no guarding.   Musculoskeletal:      Right lower leg: No edema.      Left lower leg: No edema.   Skin:     General: Skin is warm.   Neurological:      Mental Status: She is alert.   Psychiatric:

## 2025-06-16 RX ORDER — FUROSEMIDE 20 MG/1
20 TABLET ORAL AS NEEDED
Qty: 30 TABLET | Refills: 1 | Status: SHIPPED | OUTPATIENT
Start: 2025-06-16

## 2025-06-16 NOTE — TELEPHONE ENCOUNTER
Jane Cottrell needs refill of   Requested Prescriptions     Pending Prescriptions Disp Refills    furosemide (LASIX) 20 MG tablet 30 tablet 0     Sig: Take 1 tablet by mouth as needed (swelling) Only takes when flying       Last Filled on:  04/01/2025 #30 R-0 sent to [facility] on [address] by Alexandra Chamorro DO.     Last Visit Date:  6/11/2025    Next Visit Date:  Visit date not found

## 2025-07-30 DIAGNOSIS — R03.0 ELEVATED BLOOD PRESSURE READING: ICD-10-CM

## 2025-07-30 DIAGNOSIS — F41.9 ANXIETY: ICD-10-CM

## 2025-07-30 DIAGNOSIS — M79.7 FIBROMYALGIA: ICD-10-CM

## 2025-07-30 DIAGNOSIS — R42 VERTIGO: ICD-10-CM

## 2025-07-31 ENCOUNTER — PATIENT MESSAGE (OUTPATIENT)
Dept: FAMILY MEDICINE CLINIC | Age: 65
End: 2025-07-31

## 2025-07-31 DIAGNOSIS — M79.7 FIBROMYALGIA: ICD-10-CM

## 2025-07-31 RX ORDER — MECLIZINE HYDROCHLORIDE 25 MG/1
25 TABLET ORAL 3 TIMES DAILY PRN
Qty: 30 TABLET | Refills: 5 | Status: SHIPPED | OUTPATIENT
Start: 2025-07-31

## 2025-07-31 RX ORDER — GABAPENTIN 600 MG/1
600 TABLET ORAL 3 TIMES DAILY
Qty: 270 TABLET | Refills: 1 | Status: SHIPPED | OUTPATIENT
Start: 2025-07-31 | End: 2026-01-27

## 2025-07-31 RX ORDER — HYDROXYZINE HYDROCHLORIDE 25 MG/1
25 TABLET, FILM COATED ORAL 2 TIMES DAILY PRN
Qty: 60 TABLET | Refills: 2 | Status: SHIPPED | OUTPATIENT
Start: 2025-07-31

## 2025-07-31 RX ORDER — ESCITALOPRAM OXALATE 20 MG/1
20 TABLET ORAL DAILY
Qty: 90 TABLET | Refills: 3 | Status: SHIPPED | OUTPATIENT
Start: 2025-07-31

## 2025-07-31 RX ORDER — METOPROLOL TARTRATE 25 MG/1
12.5 TABLET, FILM COATED ORAL 2 TIMES DAILY PRN
Qty: 90 TABLET | Refills: 3 | Status: SHIPPED | OUTPATIENT
Start: 2025-07-31

## 2025-07-31 RX ORDER — BUSPIRONE HYDROCHLORIDE 10 MG/1
10 TABLET ORAL 2 TIMES DAILY
Qty: 180 TABLET | Refills: 3 | Status: SHIPPED | OUTPATIENT
Start: 2025-07-31

## 2025-07-31 RX ORDER — GABAPENTIN 600 MG/1
600 TABLET ORAL 2 TIMES DAILY
Qty: 180 TABLET | Refills: 1 | Status: SHIPPED | OUTPATIENT
Start: 2025-07-31 | End: 2025-07-31 | Stop reason: SDUPTHER

## 2025-07-31 NOTE — TELEPHONE ENCOUNTER
Jane Cottrell needs refill of   Requested Prescriptions     Pending Prescriptions Disp Refills    hydrOXYzine HCl (ATARAX) 25 MG tablet 60 tablet 2     Sig: Take 1 tablet by mouth 2 times daily as needed for Anxiety    busPIRone (BUSPAR) 10 MG tablet 180 tablet 3     Sig: Take 1 tablet by mouth in the morning and at bedtime    metoprolol tartrate (LOPRESSOR) 25 MG tablet 90 tablet 3     Sig: Take 0.5 tablets by mouth 2 times daily as needed (elevated blood pressures)    meclizine (ANTIVERT) 25 MG tablet 30 tablet 5     Sig: Take 1 tablet by mouth 3 times daily as needed for Dizziness    escitalopram (LEXAPRO) 20 MG tablet 90 tablet 3     Sig: Take 1 tablet by mouth daily    gabapentin (NEURONTIN) 600 MG tablet 180 tablet 1     Sig: Take 1 tablet by mouth 2 times daily for 180 days.       Last Filled on:      Last Visit Date:  6/11/2025    Next Visit Date:  Visit date not found

## 2025-08-11 ENCOUNTER — OFFICE VISIT (OUTPATIENT)
Dept: FAMILY MEDICINE CLINIC | Age: 65
End: 2025-08-11
Payer: COMMERCIAL

## 2025-08-11 VITALS
RESPIRATION RATE: 16 BRPM | TEMPERATURE: 97.5 F | BODY MASS INDEX: 30.22 KG/M2 | HEART RATE: 88 BPM | DIASTOLIC BLOOD PRESSURE: 80 MMHG | WEIGHT: 164.2 LBS | OXYGEN SATURATION: 96 % | HEIGHT: 62 IN | SYSTOLIC BLOOD PRESSURE: 144 MMHG

## 2025-08-11 DIAGNOSIS — M99.01 CERVICAL (NECK) REGION SOMATIC DYSFUNCTION: ICD-10-CM

## 2025-08-11 DIAGNOSIS — M99.08 SEGMENTAL AND SOMATIC DYSFUNCTION OF RIB CAGE: Primary | ICD-10-CM

## 2025-08-11 DIAGNOSIS — M99.03 LUMBAR REGION SOMATIC DYSFUNCTION: ICD-10-CM

## 2025-08-11 DIAGNOSIS — M99.04 SEGMENTAL DYSFUNCTION OF SACRAL REGION: ICD-10-CM

## 2025-08-11 DIAGNOSIS — M99.06 SEGMENTAL AND SOMATIC DYSFUNCTION OF LOWER EXTREMITY: ICD-10-CM

## 2025-08-11 DIAGNOSIS — M99.05 SEGMENTAL AND SOMATIC DYSFUNCTION OF PELVIC REGION: ICD-10-CM

## 2025-08-11 DIAGNOSIS — M99.02 SEGMENTAL DYSFUNCTION OF THORACIC REGION: ICD-10-CM

## 2025-08-11 PROCEDURE — 98928 OSTEOPATH MANJ 7-8 REGIONS: CPT | Performed by: FAMILY MEDICINE

## 2025-08-11 ASSESSMENT — ENCOUNTER SYMPTOMS
COUGH: 0
BACK PAIN: 1
SHORTNESS OF BREATH: 0
WHEEZING: 0

## (undated) DEVICE — GENERAL LAPAROSCOPY-LF: Brand: MEDLINE INDUSTRIES, INC.

## (undated) DEVICE — SEAL

## (undated) DEVICE — SUTURE ABSORBABLE MONOFILAMENT 1 CT-1 24 CM 36 MM VIO PDS +

## (undated) DEVICE — REDUCER: Brand: ENDOWRIST

## (undated) DEVICE — ADHESIVE SKIN CLSR 0.7ML TOP DERMBND ADV

## (undated) DEVICE — TUBING INSUFFLATOR HEAT HUMIDIFIED SMK EVAC SET PNEUMOCLEAR

## (undated) DEVICE — SUTURE ETHBND D SPEC NO 0 UR 6 30IN D9436

## (undated) DEVICE — COLUMN DRAPE

## (undated) DEVICE — GOWN,SIRUS,NON REINFRCD,LARGE,SET IN SL: Brand: MEDLINE

## (undated) DEVICE — BAG SPEC REM 224ML W4XL6IN DIA10MM 1 HND GYN DISP ENDOPCH

## (undated) DEVICE — TIP COVER ACCESSORY

## (undated) DEVICE — GENERAL LAPAROSCOPY PACK-LF: Brand: MEDLINE INDUSTRIES, INC.

## (undated) DEVICE — SUTURE ETHBND EXCEL SZ 0 L36IN NONABSORBABLE GRN SH L26MM X524H

## (undated) DEVICE — GLOVE SURG 7 PF POLYMER COAT WHT STRL SIGN LTX ESSENTIAL LTX

## (undated) DEVICE — BASIC SINGLE BASIN BTC-LF: Brand: MEDLINE INDUSTRIES, INC.

## (undated) DEVICE — SUTURE STRATAFIX SYMMETRIC SZ 1 L18IN ABSRB VLT CT1 L36CM SXPP1A404

## (undated) DEVICE — ELECTRO LUBE IS A SINGLE PATIENT USE DEVICE THAT IS INTENDED TO BE USED ON ELECTROSURGICAL ELECTRODES TO REDUCE STICKING.: Brand: KEY SURGICAL ELECTRO LUBE

## (undated) DEVICE — TROCAR: Brand: KII FIOS FIRST ENTRY

## (undated) DEVICE — GLOVE SURG SZ 7 L12IN FNGR THK94MIL TRNSLUC YEL LTX HYDRGEL

## (undated) DEVICE — AGENT HEMSTAT 3GM OXIDIZED REGENERATED CELOS ABSRB FOR CONT

## (undated) DEVICE — SOLUTION IRRIG 1000ML 0.9% SOD CHL USP POUR PLAS BTL

## (undated) DEVICE — BINDER ABD UNISX 4 PNL PREM 6INX6INX12IN L XL 4

## (undated) DEVICE — INSUFFLATION NEEDLE TO ESTABLISH PNEUMOPERITONEUM.: Brand: INSUFFLATION NEEDLE

## (undated) DEVICE — BLADELESS OBTURATOR: Brand: WECK VISTA

## (undated) DEVICE — ARM DRAPE

## (undated) DEVICE — SOLUTION ANTIFOG VIS SYS CLEARIFY LAPSCP

## (undated) DEVICE — SURGICEL ENDOSCP APPL

## (undated) DEVICE — PUMP SUC IRR TBNG L10FT W/ HNDPC ASSEMB STRYKEFLOW 2

## (undated) DEVICE — LIQUIBAND RAPID ADHESIVE 36/CS 0.8ML: Brand: MEDLINE

## (undated) DEVICE — CANNULA SEAL